# Patient Record
Sex: FEMALE | Race: WHITE | Employment: UNEMPLOYED | URBAN - METROPOLITAN AREA
[De-identification: names, ages, dates, MRNs, and addresses within clinical notes are randomized per-mention and may not be internally consistent; named-entity substitution may affect disease eponyms.]

---

## 2017-06-13 ENCOUNTER — HOSPITAL ENCOUNTER (EMERGENCY)
Facility: CLINIC | Age: 22
Discharge: HOME | End: 2017-06-13
Attending: EMERGENCY MEDICINE | Admitting: EMERGENCY MEDICINE
Payer: COMMERCIAL

## 2017-06-13 VITALS
DIASTOLIC BLOOD PRESSURE: 76 MMHG | WEIGHT: 130 LBS | OXYGEN SATURATION: 99 % | TEMPERATURE: 98.1 F | SYSTOLIC BLOOD PRESSURE: 127 MMHG | RESPIRATION RATE: 16 BRPM

## 2017-06-13 DIAGNOSIS — L03.211 CELLULITIS OF FACE: ICD-10-CM

## 2017-06-13 PROCEDURE — 25000132 ZZH RX MED GY IP 250 OP 250 PS 637: Performed by: EMERGENCY MEDICINE

## 2017-06-13 PROCEDURE — 99283 EMERGENCY DEPT VISIT LOW MDM: CPT

## 2017-06-13 RX ORDER — CEPHALEXIN 500 MG/1
500 CAPSULE ORAL 4 TIMES DAILY
Qty: 28 CAPSULE | Refills: 0 | Status: SHIPPED | OUTPATIENT
Start: 2017-06-13 | End: 2017-06-20

## 2017-06-13 RX ORDER — HYDROCODONE BITARTRATE AND ACETAMINOPHEN 5; 325 MG/1; MG/1
1-2 TABLET ORAL EVERY 4 HOURS PRN
Qty: 15 TABLET | Refills: 0 | Status: SHIPPED | OUTPATIENT
Start: 2017-06-13 | End: 2017-06-16

## 2017-06-13 RX ORDER — HYDROCODONE BITARTRATE AND ACETAMINOPHEN 5; 325 MG/1; MG/1
2 TABLET ORAL ONCE
Status: COMPLETED | OUTPATIENT
Start: 2017-06-13 | End: 2017-06-13

## 2017-06-13 RX ORDER — IBUPROFEN 600 MG/1
600 TABLET, FILM COATED ORAL ONCE
Status: COMPLETED | OUTPATIENT
Start: 2017-06-13 | End: 2017-06-13

## 2017-06-13 RX ORDER — CEPHALEXIN 500 MG/1
1000 CAPSULE ORAL ONCE
Status: COMPLETED | OUTPATIENT
Start: 2017-06-13 | End: 2017-06-13

## 2017-06-13 RX ADMIN — HYDROCODONE BITARTRATE AND ACETAMINOPHEN 2 TABLET: 5; 325 TABLET ORAL at 11:48

## 2017-06-13 RX ADMIN — CEPHALEXIN 1000 MG: 500 CAPSULE ORAL at 11:47

## 2017-06-13 RX ADMIN — IBUPROFEN 600 MG: 600 TABLET ORAL at 11:48

## 2017-06-13 ASSESSMENT — ENCOUNTER SYMPTOMS: FACIAL SWELLING: 1

## 2017-06-13 NOTE — ED PROVIDER NOTES
History     Chief Complaint:  Oral Swelling    HPI   Yamel Hernandez is a 21 year old female who presents to the ED for evaluation of facial swelling. The patient reports she noticed a bump on her right cheek two days ago, and initially believed this was a pimple. She popped this and had a white drainage, after which she had increased facial swelling on the right side. She states that this area is extremely tender to touch, and she has been taking Tylenol to manage the pain. The patient denies any dental pain at this time.     Allergies:  NKDA     Medications:    The patient is not currently taking any prescribed medications.    Past Medical History:    History reviewed. No pertinent past medical history.    Past Surgical History:    History reviewed. No pertinent past surgical history.    Family History:    History reviewed. No pertinent family history.    Social History:  The patient was accompanied to the ED by a significant other.      Review of Systems   HENT: Positive for facial swelling (right sided, tender). Negative for dental problem.    All other systems reviewed and are negative.    Physical Exam   First Vitals:  BP: 127/76  Heart Rate: 101  Temp: 98.1  F (36.7  C)  Resp: 16  Weight: 59 kg (130 lb)  SpO2: 99 %      Physical Exam  Constitutional:  female sitting  HENT: Right facial swelling centered over malar eminence.  induration, but no fluctuance. Slight redness. Tender right submandibular gland. No dental pain or peridontal fullness.   Neck: No adenopathy.   Neurological: Awake, alert, appropriate.    Emergency Department Course     Interventions:  Keflex 1000 mg PO  Norco 2 tablets PO  Ibuprofen 600 mg PO    The patient's symptoms were improved with parenteral narcotics.    Emergency Department Course:  Nursing notes and vitals reviewed.  I performed an exam of the patient as documented above.     I performed a bedside ultrasound on the patient.   The patient reported good relief  after the above interventions.    Findings and plan explained to the patient. Patient discharged home with instructions regarding supportive care, medications, and reasons to return. The importance of close follow-up was reviewed. The patient was prescribed Keflex and Norco.     Impression & Plan      Medical Decision Making:  Yamel Hernandez is a 21 year old  female who had a pimple on her right cheek, which she squeezed, and today the cheek is very swollen and tender. She denies fever or shaking chills. She has bad teeth but does not feel this is teeth related, and she has had no tenderness or drainage there. On exam, her cheek is quite swollen on the right. There is induration but I do not appreciate fluctuance. I did do a bedside ultrasound looking for abscess, which could be drained, and did not find any. The patient will be started on Keflex, Advil, and be given Norco for breakthrough pain. She is warned not to drive or operate machinery with this. I have recommended warm compresses 4x daily and follow up with PMD in two days;   if symptoms worsen, If she has fevers or shaking chills, recheck in the ED.     Diagnosis:    ICD-10-CM    1. Cellulitis of face L03.211      Disposition:  Discharged to home with the following medications:     Discharge Medications:  Discharge Medication List as of 6/13/2017 11:50 AM      START taking these medications    Details   cephALEXin (KEFLEX) 500 MG capsule Take 1 capsule (500 mg) by mouth 4 times daily for 7 days, Disp-28 capsule, R-0, Local Print      HYDROcodone-acetaminophen (NORCO) 5-325 MG per tablet Take 1-2 tablets by mouth every 4 hours as needed, Disp-15 tablet, R-0, Local Print           Anila BULLARD, mckayla serving as a scribe at 11:12 AM on 6/13/2017 to document services personally performed by Kyle Maynard MD based on my observations and the provider's statements to me.    EMERGENCY DEPARTMENT       Kyle Maynard MD  06/13/17  [id number]

## 2017-06-13 NOTE — ED AVS SNAPSHOT
Emergency Department    [address] [place] [address]    Phone:  [phone]    Fax:  [phone]                                       Yamel Hernandez   MRN: [id number]    Department:   Emergency Department   Date of Visit:  6/13/2017           Patient Information     Date Of Birth          1995        Your diagnoses for this visit were:     Cellulitis of face        You were seen by Kyle Maynard MD.      Follow-up Information     Follow up with own md In 2 days.    Why:  warm compresses 4x daily;  recheck ed, If symptoms worsen        Discharge Instructions         Facial Cellulitis  Cellulitis is an infection of the deep layers of skin. A break in the skin, such as a cut or scratch, can let bacteria under the skin. It may also occur from an infected oil gland (pimple) or hair follicle. If the bacteria get to deep layers of the skin, it can be serious. If not treated, cellulitis can get into the bloodstream and lymph nodes. The infection can then spread throughout the body. This causes serious illness.  Cellulitis causes the affected skin to become red, swollen, warm, and sore. The reddened areas have a visible border. An open sore may leak fluid (pus). You may have a fever, chills, and pain.  Cellulitis is treated with antibiotics taken for 7 to 10 days. An open sore may be cleaned and covered with cool wet gauze. Symptoms should get better 1 to 2 days after treatment is started. Make sure to take all the antibiotics for the full number of days until they are gone. Keep taking the medication even if your symptoms go away.  Home care  Follow these tips:    Take all of the antibiotic medicine exactly as directed until it is gone. Don t miss any doses, especially during the first 7 days. Don t stop taking it when your symptoms get better.    Use a cool compress (face cloth soaked in cool water) on your face to help reduce swelling and pain.    You may use acetaminophen or  ibuprofen to reduce pain. Don t use these if you have chronic liver or kidney disease, or ever had a stomach ulcer or GI bleeding. Talk with your healthcare provider first.  Follow-up care  Follow up with your healthcare provider. If your infection does not go away on 1 antibiotic, your healthcare provider will prescribe a different one.  When to seek medical advice  Call your healthcare provider right away if any of these occur:    Fever higher of 100.4  F (38.0  C) or higher after 2 days on antibiotics    Red areas that spread    Swelling or pain that gets worse    Fluid leaking from the skin (pus)    An eyelid that swells shut or leaks fluid (pus)    Headache or neck pain that gets worse    Unusual drowsiness or confusion    Convulsions (seizure)    Change in eyesight             1391-3012 [employer]. [address] [place] [address], [place] [address]. All rights reserved. This information is not intended as a substitute for professional medical care. Always follow your healthcare professional's instructions.          24 Hour Appointment Hotline       To make an appointment at any [facility], call [phone] ([phone]). If you don't have a family doctor or clinic, we will help you find one. [facility] are conveniently located to serve the needs of you and your family.             Review of your medicines      START taking        Dose / Directions Last dose taken    cephALEXin 500 MG capsule   Commonly known as:  KEFLEX   Dose:  500 mg   Quantity:  28 capsule        Take 1 capsule (500 mg) by mouth 4 times daily for 7 days   Refills:  0        HYDROcodone-acetaminophen 5-325 MG per tablet   Commonly known as:  NORCO   Dose:  1-2 tablet   Quantity:  15 tablet        Take 1-2 tablets by mouth every 4 hours as needed   Refills:  0                Prescriptions were sent or printed at these locations (2 Prescriptions)                   Other Prescriptions                Printed at  Department/Unit printer (2 of 2)         cephALEXin (KEFLEX) 500 MG capsule               HYDROcodone-acetaminophen (NORCO) 5-325 MG per tablet                Orders Needing Specimen Collection     None      Pending Results     No orders found from 6/11/2017 to 6/14/2017.            Pending Culture Results     No orders found from 6/11/2017 to 6/14/2017.            Pending Results Instructions     If you had any lab results that were not finalized at the time of your Discharge, you can call the ED Lab Result RN at [phone]. You will be contacted by this team for any positive Lab results or changes in treatment. The nurses are available 7 days a week from 10A to 6:30P.  You can leave a message 24 hours per day and they will return your call.        Test Results From Your Hospital Stay               Clinical Quality Measure: Blood Pressure Screening     Your blood pressure was checked while you were in the emergency department today. The last reading we obtained was  BP: 127/76 . Please read the guidelines below about what these numbers mean and what you should do about them.  If your systolic blood pressure (the top number) is less than 120 and your diastolic blood pressure (the bottom number) is less than 80, then your blood pressure is normal. There is nothing more that you need to do about it.  If your systolic blood pressure (the top number) is 120-139 or your diastolic blood pressure (the bottom number) is 80-89, your blood pressure may be higher than it should be. You should have your blood pressure rechecked within a year by a primary care provider.  If your systolic blood pressure (the top number) is 140 or greater or your diastolic blood pressure (the bottom number) is 90 or greater, you may have high blood pressure. High blood pressure is treatable, but if left untreated over time it can put you at risk for heart attack, stroke, or kidney failure. You should have your blood pressure rechecked by a  "primary care provider within the next 4 weeks.  If your provider in the emergency department today gave you specific instructions to follow-up with your doctor or provider even sooner than that, you should follow that instruction and not wait for up to 4 weeks for your follow-up visit.        Thank you for choosing [facility]       Thank you for choosing [facility] for your care. Our goal is always to provide you with excellent care. Hearing back from our patients is one way we can continue to improve our services. Please take a few minutes to complete the written survey that you may receive in the mail after you visit with us. Thank you!        [facility] medical Information     [employer] lets you send messages to your doctor, view your test results, renew your prescriptions, schedule appointments and more. To sign up, go to [email or web address] . Click on \"Log in\" on the left side of the screen, which will take you to the Welcome page. Then click on \"Sign up Now\" on the right side of the page.     You will be asked to enter the access code listed below, as well as some personal information. Please follow the directions to create your username and password.     Your access code is: [email or web address]  Expires: 2017 11:50 AM     Your access code will  in 90 days. If you need help or a new code, please call your [facility] clinic or [phone].        Care EveryWhere ID     This is your Care EveryWhere ID. This could be used by other organizations to access your [facility] medical records  [id number]        After Visit Summary       This is your record. Keep this with you and show to your community pharmacist(s) and doctor(s) at your next visit.                  "

## 2017-06-13 NOTE — ED AVS SNAPSHOT
Emergency Department    [address] [facility] [address]    Phone:  [phone]    Fax:  [phone]                                       Yamel Hernandez   MRN: [id number]    Department:   Emergency Department   Date of Visit:  6/13/2017           After Visit Summary Signature Page     I have received my discharge instructions, and my questions have been answered. I have discussed any challenges I see with this plan with the nurse or doctor.    ..........................................................................................................................................  Patient/Patient Representative Signature      ..........................................................................................................................................  Patient Representative Print Name and Relationship to Patient    ..................................................               ................................................  Date                                            Time    ..........................................................................................................................................  Reviewed by Signature/Title    ...................................................              ..............................................  Date                                                            Time

## 2017-06-13 NOTE — DISCHARGE INSTRUCTIONS
Facial Cellulitis  Cellulitis is an infection of the deep layers of skin. A break in the skin, such as a cut or scratch, can let bacteria under the skin. It may also occur from an infected oil gland (pimple) or hair follicle. If the bacteria get to deep layers of the skin, it can be serious. If not treated, cellulitis can get into the bloodstream and lymph nodes. The infection can then spread throughout the body. This causes serious illness.  Cellulitis causes the affected skin to become red, swollen, warm, and sore. The reddened areas have a visible border. An open sore may leak fluid (pus). You may have a fever, chills, and pain.  Cellulitis is treated with antibiotics taken for 7 to 10 days. An open sore may be cleaned and covered with cool wet gauze. Symptoms should get better 1 to 2 days after treatment is started. Make sure to take all the antibiotics for the full number of days until they are gone. Keep taking the medication even if your symptoms go away.  Home care  Follow these tips:    Take all of the antibiotic medicine exactly as directed until it is gone. Don t miss any doses, especially during the first 7 days. Don t stop taking it when your symptoms get better.    Use a cool compress (face cloth soaked in cool water) on your face to help reduce swelling and pain.    You may use acetaminophen or ibuprofen to reduce pain. Don t use these if you have chronic liver or kidney disease, or ever had a stomach ulcer or GI bleeding. Talk with your healthcare provider first.  Follow-up care  Follow up with your healthcare provider. If your infection does not go away on 1 antibiotic, your healthcare provider will prescribe a different one.  When to seek medical advice  Call your healthcare provider right away if any of these occur:    Fever higher of 100.4  F (38.0  C) or higher after 2 days on antibiotics    Red areas that spread    Swelling or pain that gets worse    Fluid leaking from the skin (pus)    An  eyelid that swells shut or leaks fluid (pus)    Headache or neck pain that gets worse    Unusual drowsiness or confusion    Convulsions (seizure)    Change in eyesight             9897-3279 [employer]. [address] [place] [address], [place] [address]. All rights reserved. This information is not intended as a substitute for professional medical care. Always follow your healthcare professional's instructions.

## 2017-06-16 ENCOUNTER — HOSPITAL ENCOUNTER (EMERGENCY)
Facility: CLINIC | Age: 22
Discharge: HOME | End: 2017-06-16
Attending: EMERGENCY MEDICINE | Admitting: EMERGENCY MEDICINE
Payer: COMMERCIAL

## 2017-06-16 VITALS
DIASTOLIC BLOOD PRESSURE: 75 MMHG | TEMPERATURE: 98.4 F | WEIGHT: 130 LBS | HEIGHT: 67 IN | BODY MASS INDEX: 20.4 KG/M2 | SYSTOLIC BLOOD PRESSURE: 119 MMHG | OXYGEN SATURATION: 100 %

## 2017-06-16 DIAGNOSIS — L02.01 FACIAL ABSCESS: ICD-10-CM

## 2017-06-16 PROCEDURE — 10060 I&D ABSCESS SIMPLE/SINGLE: CPT

## 2017-06-16 PROCEDURE — 99283 EMERGENCY DEPT VISIT LOW MDM: CPT | Mod: 25

## 2017-06-16 RX ORDER — DOXYCYCLINE 100 MG/1
100 CAPSULE ORAL 2 TIMES DAILY
Qty: 20 CAPSULE | Refills: 0 | Status: SHIPPED | OUTPATIENT
Start: 2017-06-16 | End: 2017-06-26

## 2017-06-16 RX ORDER — HYDROCODONE BITARTRATE AND ACETAMINOPHEN 5; 325 MG/1; MG/1
1-2 TABLET ORAL EVERY 6 HOURS PRN
Qty: 15 TABLET | Refills: 0 | Status: SHIPPED | OUTPATIENT
Start: 2017-06-16 | End: 2018-06-22

## 2017-06-16 ASSESSMENT — ENCOUNTER SYMPTOMS
FACIAL SWELLING: 1
WOUND: 1

## 2017-06-16 NOTE — ED AVS SNAPSHOT
Emergency Department    [address] [facility] [address]    Phone:  [phone]    Fax:  [phone]                                       Yamel Hernandez   MRN: [id number]    Department:   Emergency Department   Date of Visit:  6/16/2017           Patient Information     Date Of Birth          1995        Your diagnoses for this visit were:     Facial abscess        You were seen by Jaquan Frances MD.      Follow-up Information     Follow up with  Emergency Department.    Specialty:  EMERGENCY MEDICINE    Why:  As needed, If symptoms worsen    Contact information:    [address] [facility] [id number] [phone]        Discharge Instructions         Abscess (Incision & Drainage)  An abscess is sometimes called a boil. It happens when bacteria get trapped under the skin and start to grow. Pus forms inside the abscess as the body responds to the bacteria. An abscess can happen with an insect bite, ingrown hair, blocked oil gland, pimple, cyst, or puncture wound.  Your healthcare provider has drained the pus from your abscess. If the abscess pocket was large, your healthcare provider may have put in gauze packing. Your provider will need to remove it on your next visit. He or she may also replace it at that time. You may not need antibiotics to treat a simple abscess, unless the infection is spreading into the skin around the wound (cellulitis).  The wound will take about 1 to 2 weeks to heal, depending on the size of the abscess. Healthy tissue will grow from the bottom and sides of the opening until it seals over.  Home care  These tips can help your wound heal:    The wound may drain for the first 2 days. Cover the wound with a clean dry dressing. Change the dressing if it becomes soaked with blood or pus.    If a gauze packing was placed inside the abscess pocket, you may be told to remove it yourself. You may do this in the shower. Once the packing is removed,  you should wash the area in the shower, or clean the area as directed by your provider. Continue to do this until the skin opening has closed. Make sure you wash your hands after changing the packing or cleaning the wound.    If you were prescribed antibiotics, take them as directed until they are all gone.    You may use acetaminophen or ibuprofen to control pain, unless another pain medicine was prescribed. If you have liver disease or ever had a stomach ulcer, talk with your doctor before using these medicines.  Follow-up care  Follow up with your healthcare provider, or as advised. If a gauze packing was put in your wound, it should be removed in 1 to 2 days. Check your wound every day for any signs that the infection is getting worse. The signs are listed below.  When to seek medical advice  Call your healthcare provider right away if any of these occur:    Increasing redness or swelling    Red streaks in the skin leading away from the wound    Increasing local pain or swelling    Continued pus draining from the wound 2 days after treatment    Fever of 100.4 F (38 C) or higher, or as directed by your healthcare provider    Boil returns when you are at home  Date Last Reviewed: 9/1/2016 2000-2017 [employer]. [address] [place] [address]. All rights reserved. This information is not intended as a substitute for professional medical care. Always follow your healthcare professional's instructions.        Doxycycline  Norco as directed  Continue Chelaile      24 Hour Appointment Hotline       To make an appointment at any [facility] clinic, call [phone] ([phone]). If you don't have a family doctor or clinic, we will help you find one. [facility] clinics are conveniently located to serve the needs of you and your family.             Review of your medicines      START taking        Dose / Directions Last dose taken    doxycycline 100 MG capsule   Commonly known as:  VIBRAMYCIN    Dose:  100 mg   Quantity:  20 capsule        Take 1 capsule (100 mg) by mouth 2 times daily for 10 days   Refills:  0          CONTINUE these medicines which may have CHANGED, or have new prescriptions. If we are uncertain of the size of tablets/capsules you have at home, strength may be listed as something that might have changed.        Dose / Directions Last dose taken    HYDROcodone-acetaminophen 5-325 MG per tablet   Commonly known as:  NORCO   Dose:  1-2 tablet   What changed:    - when to take this  - reasons to take this   Quantity:  15 tablet        Take 1-2 tablets by mouth every 6 hours as needed for moderate to severe pain   Refills:  0          Our records show that you are taking the medicines listed below. If these are incorrect, please call your family doctor or clinic.        Dose / Directions Last dose taken    cephALEXin 500 MG capsule   Commonly known as:  KEFLEX   Dose:  500 mg   Quantity:  28 capsule        Take 1 capsule (500 mg) by mouth 4 times daily for 7 days   Refills:  0                Prescriptions were sent or printed at these locations (2 Prescriptions)                   Other Prescriptions                Printed at Department/Unit printer (2 of 2)         doxycycline (VIBRAMYCIN) 100 MG capsule               HYDROcodone-acetaminophen (NORCO) 5-325 MG per tablet                Orders Needing Specimen Collection     None      Pending Results     No orders found from 6/14/2017 to 6/17/2017.            Pending Culture Results     No orders found from 6/14/2017 to 6/17/2017.            Pending Results Instructions     If you had any lab results that were not finalized at the time of your Discharge, you can call the ED Lab Result RN at [phone]. You will be contacted by this team for any positive Lab results or changes in treatment. The nurses are available 7 days a week from 10A to 6:30P.  You can leave a message 24 hours per day and they will return your call.        Test Results  From Your Hospital Stay               Clinical Quality Measure: Blood Pressure Screening     Your blood pressure was checked while you were in the emergency department today. The last reading we obtained was  BP: 130/59 . Please read the guidelines below about what these numbers mean and what you should do about them.  If your systolic blood pressure (the top number) is less than 120 and your diastolic blood pressure (the bottom number) is less than 80, then your blood pressure is normal. There is nothing more that you need to do about it.  If your systolic blood pressure (the top number) is 120-139 or your diastolic blood pressure (the bottom number) is 80-89, your blood pressure may be higher than it should be. You should have your blood pressure rechecked within a year by a primary care provider.  If your systolic blood pressure (the top number) is 140 or greater or your diastolic blood pressure (the bottom number) is 90 or greater, you may have high blood pressure. High blood pressure is treatable, but if left untreated over time it can put you at risk for heart attack, stroke, or kidney failure. You should have your blood pressure rechecked by a primary care provider within the next 4 weeks.  If your provider in the emergency department today gave you specific instructions to follow-up with your doctor or provider even sooner than that, you should follow that instruction and not wait for up to 4 weeks for your follow-up visit.        Thank you for choosing [facility]       Thank you for choosing [facility] for your care. Our goal is always to provide you with excellent care. Hearing back from our patients is one way we can continue to improve our services. Please take a few minutes to complete the written survey that you may receive in the mail after you visit with us. Thank you!        [employer] Information     [employer] lets you send messages to your doctor, view your test results, renew your prescriptions, schedule  "appointments and more. To sign up, go to [email or web address] . Click on \"Log in\" on the left side of the screen, which will take you to the Welcome page. Then click on \"Sign up Now\" on the right side of the page.     You will be asked to enter the access code listed below, as well as some personal information. Please follow the directions to create your username and password.     Your access code is: [email or web address]  Expires: 2017 11:50 AM     Your access code will  in 90 days. If you need help or a new code, please call your [facility] clinic or [phone].        Care EveryWhere ID     This is your Care EveryWhere ID. This could be used by other organizations to access your [facility] medical records  [id number]        After Visit Summary       This is your record. Keep this with you and show to your community pharmacist(s) and doctor(s) at your next visit.                  "

## 2017-06-16 NOTE — DISCHARGE INSTRUCTIONS
Abscess (Incision & Drainage)  An abscess is sometimes called a boil. It happens when bacteria get trapped under the skin and start to grow. Pus forms inside the abscess as the body responds to the bacteria. An abscess can happen with an insect bite, ingrown hair, blocked oil gland, pimple, cyst, or puncture wound.  Your healthcare provider has drained the pus from your abscess. If the abscess pocket was large, your healthcare provider may have put in gauze packing. Your provider will need to remove it on your next visit. He or she may also replace it at that time. You may not need antibiotics to treat a simple abscess, unless the infection is spreading into the skin around the wound (cellulitis).  The wound will take about 1 to 2 weeks to heal, depending on the size of the abscess. Healthy tissue will grow from the bottom and sides of the opening until it seals over.  Home care  These tips can help your wound heal:    The wound may drain for the first 2 days. Cover the wound with a clean dry dressing. Change the dressing if it becomes soaked with blood or pus.    If a gauze packing was placed inside the abscess pocket, you may be told to remove it yourself. You may do this in the shower. Once the packing is removed, you should wash the area in the shower, or clean the area as directed by your provider. Continue to do this until the skin opening has closed. Make sure you wash your hands after changing the packing or cleaning the wound.    If you were prescribed antibiotics, take them as directed until they are all gone.    You may use acetaminophen or ibuprofen to control pain, unless another pain medicine was prescribed. If you have liver disease or ever had a stomach ulcer, talk with your doctor before using these medicines.  Follow-up care  Follow up with your healthcare provider, or as advised. If a gauze packing was put in your wound, it should be removed in 1 to 2 days. Check your wound every day for any  signs that the infection is getting worse. The signs are listed below.  When to seek medical advice  Call your healthcare provider right away if any of these occur:    Increasing redness or swelling    Red streaks in the skin leading away from the wound    Increasing local pain or swelling    Continued pus draining from the wound 2 days after treatment    Fever of 100.4 F (38 C) or higher, or as directed by your healthcare provider    Boil returns when you are at home  Date Last Reviewed: 9/1/2016 2000-2017 [employer]. [address] [place] [address]. All rights reserved. This information is not intended as a substitute for professional medical care. Always follow your healthcare professional's instructions.        Doxycycline  Norco as directed  Continue Keflex

## 2017-06-16 NOTE — ED AVS SNAPSHOT
Emergency Department    [address] [facility] [address]    Phone:  [phone]    Fax:  [phone]                                       Yamel Hernandez   MRN: [id number]    Department:   Emergency Department   Date of Visit:  6/16/2017           After Visit Summary Signature Page     I have received my discharge instructions, and my questions have been answered. I have discussed any challenges I see with this plan with the nurse or doctor.    ..........................................................................................................................................  Patient/Patient Representative Signature      ..........................................................................................................................................  Patient Representative Print Name and Relationship to Patient    ..................................................               ................................................  Date                                            Time    ..........................................................................................................................................  Reviewed by Signature/Title    ...................................................              ..............................................  Date                                                            Time

## 2017-06-16 NOTE — ED PROVIDER NOTES
"  History     Chief Complaint:  Cellulitis     HPI   Yamel Hernandez is a 21 year old female who presents to the emergency department today for evaluation of cellulitis. The patient had a diagnosis of cellulitis on her right cheek and has been taking keflex as prescribed, but was not given any topical creams. She reports scratching the area this afternoon and it started to drain pus and blood, then the area became more swollen. The patient also notes having jaw pain and thinks her wisdom teeth might be hurting her. No other symptoms at this time.    Allergies:  No Known Drug Allergies     Medications:    Keflex  Norco     Past Medical History:    History reviewed. No pertinent past medical history.    Past Surgical History:    History reviewed. No pertinent past surgical history.    Family History:    History reviewed. No pertinent family history.     Social History:  The patient was accompanied to the ED by boyfriend.  Smoking Status: former   Marital Status:  Single [1]     Review of Systems   HENT: Positive for facial swelling (right cheek).    Skin: Positive for wound (abscess, right cheek).   All other systems reviewed and are negative.    Physical Exam   Vitals:  Patient Vitals for the past 24 hrs:   BP Temp Temp src Heart Rate SpO2 Height Weight   06/16/17 0054 130/59 98.4  F (36.9  C) Oral 93 99 % 1.702 m (5' 7\") 59 kg (130 lb)     Physical Exam    General: Resting comfortably on the gurney  Head:  The scalp, face, and head appear normal  Eyes:  The pupils are normal    Conjunctivae and sclera appear normal  ENT:    The nose is normal    Ears/pinnae are normal    Right mandibular second premolar and 6 year molar are necrotic and down to the gumline    No intraoral abscess     Right upper second premolar is also similarly necrotic to the gingiva    There is a skin based abscess  Involving the right cheek by palpation this is 2 x 2 cm there is some mild serous   drainage for the skin, there is no marley " cellulitis  Neck:  Normal range of motion  Skin:  No rash or lesions noted.  Neuro: Speech is normal and fluent  Psych:  Awake. Alert.  Normal affect.      Appropriate interactions    Emergency Department Course     Procedures:  Regional Nerve Block      INDICATIONS: Right facial abscess    ANESTHESIA: Right infraoral nerve block using Bupivacaine without Epinephrine. Intra-oral injection      PATIENT STATUS: The patient tolerated the procedure well and there were no immediate complications.          Procedure: Incision and Drainage     LOCATIONS:  Right Cheek      ANESTHESIA:  Infraorbital nerve block as noted above.     PREPARATION:  Cleansed with iodine.    PROCEDURE:  Area was incised with # 11 Blade (Sharp Point) with a Single Straight  5 mm incision.  Wound treatment included Deloculation, Purulent Drainage and Expression of Material. Appropriate dressing was applied to cover the area.    Patient Status: Patient tolerated the procedure well. There were no complications.    Emergency Department Course:  Nursing notes and vitals reviewed.  I performed an exam of the patient as documented above.   At 0143 dental block performed, see above.  At 0220 incision and drainage procedure performed on right cheek.  I discussed the treatment plan with the patient. They expressed understanding of this plan and consented to discharge. They will be discharged home with instructions for care and follow up. In addition, the patient will return to the emergency department if their symptoms persist, worsen, if new symptoms arise or if there is any concern.  All questions were answered.  I personally reviewed the results with the patient and answered all related questions prior to discharge.    Impression & Plan      Medical Decision Making:  Yamel Hernandez is a 21 year old female who presents wit ha right facial abscess as noted above. This has been unresponsive to keflex mainly because it has been unable to drain. The abscess  was initially aspirated and purulence was noted, so a small 0.5 cm incision was made with an #11 blade and the pus was evacuated. Deloculations were broken up with a montserrat clamp. There is no facial cellulitis. Doxycycline will be added as well, in addition a pain medication will be given for the next day or two given the size of the abscess and the location. The patient should continue to take the keflex. Return for worsening symptoms as discussed.    Diagnosis:  Facial abscess L02.01      Disposition:   The patient was discharged to home.    Discharge Medications:   DOXYCYCLINE (VIBRAMYCIN) 100 MG CAPSULE    Take 1 capsule (100 mg) by mouth 2 times daily for 10 days    HYDROCODONE-ACETAMINOPHEN (NORCO) 5-325 MG PER TABLET    Take 1-2 tablets by mouth every 6 hours as needed for moderate to severe pain     Scribe Disclosure:  Mayank BULLARD, am serving as a scribe at 1:06 AM on 6/16/2017 to document services personally performed by Jaquan Frances MD, based on my observations and the provider's statements to me.  6/16/2017    EMERGENCY DEPARTMENT       Jaquan Frances MD  06/16/17 0703

## 2018-06-22 ENCOUNTER — HOSPITAL ENCOUNTER (INPATIENT)
Facility: CLINIC | Age: 23
LOS: 10 days | Discharge: HOME | End: 2018-07-02
Attending: PSYCHIATRY & NEUROLOGY | Admitting: PSYCHIATRY & NEUROLOGY
Payer: MEDICAID

## 2018-06-22 DIAGNOSIS — F11.23 OPIOID DEPENDENCE WITH WITHDRAWAL (H): ICD-10-CM

## 2018-06-22 PROBLEM — F11.93 HEROIN WITHDRAWAL (H): Status: ACTIVE | Noted: 2018-06-22

## 2018-06-22 LAB
AMPHETAMINES UR QL SCN: POSITIVE
BARBITURATES UR QL: NEGATIVE
BENZODIAZ UR QL: NEGATIVE
CANNABINOIDS UR QL SCN: POSITIVE
COCAINE UR QL: NEGATIVE
ETHANOL UR QL SCN: NEGATIVE
OPIATES UR QL SCN: POSITIVE

## 2018-06-22 PROCEDURE — 80307 DRUG TEST PRSMV CHEM ANLYZR: CPT | Performed by: PSYCHIATRY & NEUROLOGY

## 2018-06-22 PROCEDURE — 25000132 ZZH RX MED GY IP 250 OP 250 PS 637: Performed by: PSYCHIATRY & NEUROLOGY

## 2018-06-22 PROCEDURE — HZ2ZZZZ DETOXIFICATION SERVICES FOR SUBSTANCE ABUSE TREATMENT: ICD-10-PCS | Performed by: PSYCHIATRY & NEUROLOGY

## 2018-06-22 PROCEDURE — 12800012 ZZH R&B CD MH INTERMEDIATE ADULT

## 2018-06-22 PROCEDURE — 99285 EMERGENCY DEPT VISIT HI MDM: CPT | Mod: Z6 | Performed by: PSYCHIATRY & NEUROLOGY

## 2018-06-22 PROCEDURE — 25000125 ZZHC RX 250: Performed by: PSYCHIATRY & NEUROLOGY

## 2018-06-22 PROCEDURE — 99285 EMERGENCY DEPT VISIT HI MDM: CPT | Performed by: PSYCHIATRY & NEUROLOGY

## 2018-06-22 PROCEDURE — 80320 DRUG SCREEN QUANTALCOHOLS: CPT | Performed by: PSYCHIATRY & NEUROLOGY

## 2018-06-22 RX ORDER — ACETAMINOPHEN 325 MG/1
650 TABLET ORAL EVERY 4 HOURS PRN
Status: DISCONTINUED | OUTPATIENT
Start: 2018-06-22 | End: 2018-07-02 | Stop reason: HOSPADM

## 2018-06-22 RX ORDER — BUPRENORPHINE 2 MG/1
4 TABLET SUBLINGUAL ONCE
Status: DISCONTINUED | OUTPATIENT
Start: 2018-06-23 | End: 2018-06-23

## 2018-06-22 RX ORDER — BISACODYL 10 MG
10 SUPPOSITORY, RECTAL RECTAL DAILY PRN
Status: DISCONTINUED | OUTPATIENT
Start: 2018-06-22 | End: 2018-07-02 | Stop reason: HOSPADM

## 2018-06-22 RX ORDER — TRAZODONE HYDROCHLORIDE 50 MG/1
50 TABLET, FILM COATED ORAL
Status: DISCONTINUED | OUTPATIENT
Start: 2018-06-22 | End: 2018-07-02 | Stop reason: HOSPADM

## 2018-06-22 RX ORDER — LOPERAMIDE HCL 2 MG
2 CAPSULE ORAL 4 TIMES DAILY PRN
Status: DISCONTINUED | OUTPATIENT
Start: 2018-06-22 | End: 2018-07-02 | Stop reason: HOSPADM

## 2018-06-22 RX ORDER — BUPRENORPHINE 2 MG/1
4 TABLET SUBLINGUAL 2 TIMES DAILY
Status: DISCONTINUED | OUTPATIENT
Start: 2018-06-23 | End: 2018-07-02 | Stop reason: HOSPADM

## 2018-06-22 RX ORDER — CLONIDINE HYDROCHLORIDE 0.1 MG/1
0.1 TABLET ORAL 2 TIMES DAILY PRN
Status: DISCONTINUED | OUTPATIENT
Start: 2018-06-22 | End: 2018-07-02 | Stop reason: HOSPADM

## 2018-06-22 RX ORDER — LORAZEPAM 1 MG/1
1-2 TABLET ORAL EVERY 4 HOURS PRN
Status: DISPENSED | OUTPATIENT
Start: 2018-06-22 | End: 2018-06-23

## 2018-06-22 RX ORDER — ONDANSETRON 4 MG/1
4 TABLET, ORALLY DISINTEGRATING ORAL EVERY 6 HOURS PRN
Status: DISCONTINUED | OUTPATIENT
Start: 2018-06-22 | End: 2018-07-02 | Stop reason: HOSPADM

## 2018-06-22 RX ORDER — HYDROXYZINE HYDROCHLORIDE 25 MG/1
25 TABLET, FILM COATED ORAL EVERY 4 HOURS PRN
Status: DISCONTINUED | OUTPATIENT
Start: 2018-06-22 | End: 2018-07-02 | Stop reason: HOSPADM

## 2018-06-22 RX ORDER — IBUPROFEN 600 MG/1
600 TABLET, FILM COATED ORAL EVERY 6 HOURS PRN
Status: DISCONTINUED | OUTPATIENT
Start: 2018-06-22 | End: 2018-07-01

## 2018-06-22 RX ORDER — ALUMINA, MAGNESIA, AND SIMETHICONE 2400; 2400; 240 MG/30ML; MG/30ML; MG/30ML
30 SUSPENSION ORAL EVERY 4 HOURS PRN
Status: DISCONTINUED | OUTPATIENT
Start: 2018-06-22 | End: 2018-07-02 | Stop reason: HOSPADM

## 2018-06-22 RX ADMIN — LORAZEPAM 2 MG: 1 TABLET ORAL at 22:07

## 2018-06-22 RX ADMIN — NICOTINE POLACRILEX 2 MG: 2 GUM, CHEWING ORAL at 20:26

## 2018-06-22 RX ADMIN — HYDROXYZINE HYDROCHLORIDE 25 MG: 25 TABLET ORAL at 20:25

## 2018-06-22 RX ADMIN — IBUPROFEN 600 MG: 600 TABLET ORAL at 20:25

## 2018-06-22 RX ADMIN — ONDANSETRON 4 MG: 4 TABLET, ORALLY DISINTEGRATING ORAL at 22:07

## 2018-06-22 RX ADMIN — LOPERAMIDE HYDROCHLORIDE 2 MG: 2 CAPSULE ORAL at 20:26

## 2018-06-22 ASSESSMENT — ENCOUNTER SYMPTOMS
APPETITE CHANGE: 1
ENDOCRINE NEGATIVE: 1
ARTHRALGIAS: 1
HYPERACTIVE: 0
MYALGIAS: 1
RESPIRATORY NEGATIVE: 1
ACTIVITY CHANGE: 1
HALLUCINATIONS: 0
CARDIOVASCULAR NEGATIVE: 1
HEMATOLOGIC/LYMPHATIC NEGATIVE: 1
NERVOUS/ANXIOUS: 0
GASTROINTESTINAL NEGATIVE: 1
NEUROLOGICAL NEGATIVE: 1
SLEEP DISTURBANCE: 1
EYES NEGATIVE: 1
DECREASED CONCENTRATION: 1
DIAPHORESIS: 1
CHILLS: 1

## 2018-06-22 ASSESSMENT — ACTIVITIES OF DAILY LIVING (ADL)
GROOMING: INDEPENDENT
ORAL_HYGIENE: INDEPENDENT
DRESS: INDEPENDENT

## 2018-06-22 NOTE — ED NOTES
Patient arrives to [facility]. Psych Associate explains process and gives patient urine cup. Patient told about meeting with Mental Health  and Psychiatrist. Patient told about 2-5 hour time frame for complete evaluation.

## 2018-06-22 NOTE — IP AVS SNAPSHOT
MRN:[id number]                      After Visit Summary   6/22/2018    Yamel Hernandez    MRN: [id number]           Thank you!     Thank you for choosing [facility] for your care. Our goal is always to provide you with excellent care.        Patient Information     Date Of Birth          1995        Designated Caregiver       Most Recent Value    Caregiver    Will someone help with your care after discharge? yes    Name of designated caregiver see previous    Phone number of caregiver [phone]    Caregiver address na      About your hospital stay     You were admitted on:  June 22, 2018 You last received care in the:  [facility]    You were discharged on:  July 2, 2018       Who to Call     For medical emergencies, please call 911.  For non-urgent questions about your medical care, please call your primary care provider or clinic, None          Attending Provider     Provider Specialty    Salomón Hunter MD Psychiatry    Yeimy Valiente MD Psychiatry       Primary Care Provider Fax #    [facility] [phone]      Further instructions from your care team                     Behavioral Discharge Planning and Instructions  THANK YOU FOR CHOOSING THE [facility]  [phone]    Summary: You were admitted to [facility] on 6/22/18 for detoxification from opiates.  A medical exam was performed that included lab work. You have met with a  and opted to enter treatment at [facility].  You are scheduled for admission on Friday, July 6th at 9:30 AM.  The address is [address] [place] [address].  Please arrive about 15 minutes early.  Bring your clothing, a 30 day supply of medications and your hygiene products with you.  Your funding is through [facility].  You have a Suboxone maintenance appointment scheduled as noted below.  Please take care and make your recovery a priority, Miss Hernandez!    Main Diagnosis: Per   Yeimy Valiente MD;  305.20 (F12.10) Cannabis Use Disorder Mild  304.00 (F11.20) Opioid Use Disorder Severe  304.40 (F15.20) Amphetamine Use Disorder Severe    Major Treatments, Procedures and Findings:  You have withdrawn from opiates  using buprenorphine.  You have met with a  to develop a treatment plan for discharge.  You have had labs drawn and those results have been reviewed with you. Please take a copy of your lab work with you to your next primary care physician appointment.    Symptoms to Report:  If you experience more anxiety, confusion, sleeplessness, deep sadness or thoughts of suicide, notify your treatment team or notify your primary care physician. IF ANY OF THE SYMPTOMS YOU ARE EXPERIENCING ARE A MEDICAL EMERGENCY CALL 911 IMMEDIATELY.     Lifestyle Adjustment: Adjust your lifestyle to get enough sleep, relaxation, exercise and  good nutrition. Continue to develop healthy coping skills to decrease stress and promote a sober living environment. Do not use alcohol, illegal drugs or addictive medications other than what is currently prescribed. AA, NA, and  Sponsor are excellent resources for support.     Suboxone Maintenance:  [facility], [address] [facility]   Thursday, July 5th @ 1:45 PM with Dr. Sampson  #[phone]    Disposition:   [facility] Friday, July 6th @ 9:30 AM  [address] [facility]  [phone]    Resource: National [place] on Mental Illness (www.[place].cherelle.org): 932.565.4937 or 313-962-6286.  Alcoholics Anonymous (www.alcoholics-anonymous.org): Check your phone book for your local chapter.  Suicide Awareness Voices of Education (SAVE) (www.save.org): 418-659-IZIC (5583)  National Suicide Prevention Line (www.mentalhealthmn.org): 978-691-JHXJ (7290)  Mental Health Consumer/Survivor Network of [place] (www.mhcsn.net): 573.270.4934 or 138-964-4010  Mental Health Association of [place] (www.mentalhealth.org): 218.171.8738 or 920-513-3743    General  "Medication Instructions:  See your medication sheet(s) for instructions.   Take all medicines as directed.  Make no changes unless your doctor suggests them.   Do not use any drugs not prescribed by your provider.  Avoid alcohol.    Any follow up concerns:  Nursing questions call the Unit -[facility] [phone]  Medical Record call [phone]   Outpatient Behavioral Intake call [phone]  LP+ Wait List/Bed Availability call [phone]  The entire treatment team has appreciated the opportunity to work with you Yamel.  We wish you the best in the future and with your lifelong recovery goals. Please bring this discharge folder with you to all follow up appointments.  It contains your lab results, diagnosis, medication list and discharge recommendations.  THANK YOU FOR CHOOSING THE [facility]     Pending Results     No orders found from 6/20/2018 to 6/23/2018.            Statement of Approval     Ordered          07/02/18 1027  I have reviewed and agree with all the recommendations and orders detailed in this document.  EFFECTIVE NOW     Approved and electronically signed by:  Yeimy Valiente MD             Admission Information     Date & Time Provider Department Dept. Phone    6/22/2018 Yeimy Valiente MD [facility] [phone]      Your Vitals Were     Blood Pressure Pulse Temperature Respirations Height Weight    108/69 105 97.4  F (36.3  C) (Oral) 16 1.727 m (5' 8\") 65.3 kg (144 lb)    Pulse Oximetry BMI (Body Mass Index)                100% 21.9 kg/m2          MyChart Information     [employer] lets you send messages to your doctor, view your test results, renew your prescriptions, schedule appointments and more. To sign up, go to [email or web address] . Click on \"Log in\" on the left side of the screen, which will take you to the Welcome page. Then click on \"Sign up Now\" on the right side of the page.     You will be asked to enter the " access code listed below, as well as some personal information. Please follow the directions to create your username and password.     Your access code is: [email or web address]  Expires: 2018 10:47 AM     Your access code will  in 90 days. If you need help or a new code, please call your [place] clinic or [phone].        Care EveryWhere ID     This is your Care EveryWhere ID. This could be used by other organizations to access your [place] medical records  [id number]        Equal Access to Services     [facility]: Hadirma onofre Soflower, wanidhi luqadaha, qaybchemo kaalmakaylah escobedo, nova hoffmann . So [facility] [phone].    ATENCIÓN: Si habla español, tiene a rosas disposición servicios gratuitos de asistencia lingüística. Llame al [phone].    We comply with applicable federal civil rights laws and [place] laws. We do not discriminate on the basis of race, color, national origin, age, disability, sex, sexual orientation, or gender identity.               Review of your medicines      START taking        Dose / Directions    buprenorphine HCl-naloxone HCl 8-2 MG per film   Commonly known as:  SUBOXONE   Used for:  Opioid dependence with withdrawal (H)        Dose:  1 Film   Place 1 Film under the tongue daily   Quantity:  7 Film   Refills:  0       ferrous sulfate 325 (65 Fe) MG tablet   Commonly known as:  IRON   Used for:  Opioid dependence with withdrawal (H)        Dose:  325 mg   Take 1 tablet (325 mg) by mouth 2 times daily   Quantity:  100 tablet   Refills:  0       hydrOXYzine 25 MG tablet   Commonly known as:  ATARAX   Used for:  Opioid dependence with withdrawal (H)        Dose:  25 mg   Take 1 tablet (25 mg) by mouth every 8 hours as needed for anxiety   Quantity:  30 tablet   Refills:  0       nicotine polacrilex 2 MG gum   Commonly known as:  NICORETTE   Used for:  Opioid dependence with withdrawal (H)        Dose:  2-4 mg   Place 1-2 each (2-4 mg) inside  cheek every 2 hours as needed for other (nicotine withdrawal symptoms)   Quantity:  30 tablet   Refills:  1       prazosin 1 MG capsule   Commonly known as:  MINIPRESS   Used for:  Opioid dependence with withdrawal (H)        Dose:  1 mg   Take 1 capsule (1 mg) by mouth At Bedtime   Quantity:  30 capsule   Refills:  0       traZODone 50 MG tablet   Commonly known as:  DESYREL   Used for:  Opioid dependence with withdrawal (H)        Dose:  50 mg   Take 1 tablet (50 mg) by mouth nightly as needed for sleep   Quantity:  30 tablet   Refills:  0         CONTINUE these medicines which have NOT CHANGED        Dose / Directions    etonogestrel 68 MG Impl   Commonly known as:  IMPLANON/NEXPLANON        Dose:  1 each   1 each by Subdermal route once   Refills:  0            Where to get your medicines      These medications were sent to [facility] [place], [place] - [address]  [address] [address]     Phone:  [phone]     ferrous sulfate 325 (65 Fe) MG tablet    hydrOXYzine 25 MG tablet    nicotine polacrilex 2 MG gum    prazosin 1 MG capsule    traZODone 50 MG tablet         Some of these will need a paper prescription and others can be bought over the counter. Ask your nurse if you have questions.     Bring a paper prescription for each of these medications     buprenorphine HCl-naloxone HCl 8-2 MG per film                Protect others around you: Learn how to safely use, store and throw away your medicines at www.disposemymeds.org.             Medication List: This is a list of all your medications and when to take them. Check marks below indicate your daily home schedule. Keep this list as a reference.      Medications           Morning Afternoon Evening Bedtime As Needed    buprenorphine HCl-naloxone HCl 8-2 MG per film   Commonly known as:  SUBOXONE   Place 1 Film under the tongue daily                                   etonogestrel 68 MG Impl   Commonly known as:   IMPLANON/NEXPLANON   1 each by Subdermal route once                                   ferrous sulfate 325 (65 Fe) MG tablet   Commonly known as:  IRON   Take 1 tablet (325 mg) by mouth 2 times daily   Last time this was given:  325 mg on 7/2/2018  8:33 AM                                      hydrOXYzine 25 MG tablet   Commonly known as:  ATARAX   Take 1 tablet (25 mg) by mouth every 8 hours as needed for anxiety   Last time this was given:  25 mg on 7/2/2018  8:33 AM                            For anxiety       nicotine polacrilex 2 MG gum   Commonly known as:  NICORETTE   Place 1-2 each (2-4 mg) inside cheek every 2 hours as needed for other (nicotine withdrawal symptoms)   Last time this was given:  4 mg on 7/2/2018  8:33 AM                            For nicotine replacement       prazosin 1 MG capsule   Commonly known as:  MINIPRESS   Take 1 capsule (1 mg) by mouth At Bedtime   Last time this was given:  1 mg on 7/1/2018  9:29 PM                                   traZODone 50 MG tablet   Commonly known as:  DESYREL   Take 1 tablet (50 mg) by mouth nightly as needed for sleep   Last time this was given:  50 mg on 7/1/2018 11:46 PM                            For sleep

## 2018-06-22 NOTE — PHARMACY-ADMISSION MEDICATION HISTORY
Admission Medication History status for the 6/22/2018 admission is complete.  See EPIC admission navigator for Prior to Admission medications.    Medication history sources:  Patient     Medication history source reliability: Poor    Medication adherence:  Good    Changes made to PTA medication list (reason)  Added: none  Deleted: none  Changed: none    Additional medication history information (including reliability of information, actions taken by pharmacist):   -Patient was a poor historian; was difficult to wake and keep alert.   -Patient stated that she is currently not taking any oral medications.   -Patient stated that she still has Implanon/Nexplanon implant still in. Did not recall when it was placed.   -Patient did not report any particular pharmacy that she has filled at recently.     Time spent in this activity: 15 minutes    Medication history completed by: Komal Chopra, PD3 Pharmacy Intern     Prior to Admission medications    Medication Sig Last Dose Taking? Auth Provider   etonogestrel (IMPLANON/NEXPLANON) 68 MG IMPL 1 each by Subdermal route once  Yes Reported, Patient

## 2018-06-22 NOTE — ED NOTES
ED to Behavioral Floor Handoff    SITUATION  Yamel Hernandez is a 22 year old female who speaks English and lives is homeless unknown The patient arrived in the ED by private car from home with a complaint of Addiction Problem (Uses IV heroin. Uses approximately one gram IV. Last use 0400. Not sleeping for 3 days. Homeless. Stayed outside. Looking for detox. Very sleepy in triage. )  .The patient's current symptoms started/worsened 3 month(s) ago and during this time the symptoms have increased.   In the ED, pt was diagnosed with   Final diagnoses:   Opioid dependence with withdrawal (H)        Initial vitals were: BP: 116/62  Heart Rate: 100  Temp: 96.5  F (35.8  C)  Resp: 16  Weight: 70.9 kg (156 lb 6 oz)  SpO2: 100 %   --------  Is the patient diabetic? No   If yes, last blood glucose? --     If yes, was this treated in the ED? --  --------  Is the patient inebriated (ETOH) No or Impaired on other substances? Yes  MSSA done? N/A  Last MSSA score: --    Were withdrawal symptoms treated? N/A  Does the patient have a seizure history? No. If yes, date of most recent seizure--  --------  Is the patient patient experiencing suicidal ideation? denies current or recent suicidal ideation     Homicidal ideation? denies current or recent homicidal ideation or behaviors.    Self-injurious behavior/urges? denies current or recent self injurious behavior or ideation.  ------  Was pt aggressive in the ED No  Was a code called No  Is the pt now cooperative? Yes  -------  Meds given in ED: Medications - No data to display   Family present during ED course? Yes  Family currently present? Yes    BACKGROUND  Does the patient have a cognitive impairment or developmental disability? No  Allergies: No Known Allergies.   Social demographics are   Social History     Social History     Marital status: Single     Spouse name: N/A     Number of children: N/A     Years of education: N/A     Social History Main Topics     Smoking status:  Former Smoker     Packs/day: 0.25     Years: 7.00     Quit date: 5/26/2017     Smokeless tobacco: None     Alcohol use No     Drug use: No     Sexual activity: Not Asked     Other Topics Concern     None     Social History Narrative        ASSESSMENT  Labs results   Labs Ordered and Resulted from Time of ED Arrival Up to the Time of Departure from the ED   DRUG ABUSE SCREEN 6 CHEM DEP URINE ([facility])      Imaging Studies: No results found for this or any previous visit (from the past 24 hour(s)).   Most recent vital signs /62  Temp 96.5  F (35.8  C) (Oral)  Resp 16  Wt 70.9 kg (156 lb 6 oz)  SpO2 100%  Breastfeeding? No  BMI 24.49 kg/m2   Abnormal labs/tests/findings requiring intervention:---   Pain control: pt had none  Nausea control: pt had none    RECOMMENDATION  Are any infection precautions needed (MRSA, VRE, etc.)? No If yes, what infection? --  ---  Does the patient have mobility issues? independently. If yes, what device does the pt use? ---  ---  Is patient on 72 hour hold or commitment? No If on 72 hour hold, have hold and rights been given to patient? N/A  Are admitting orders written if after 10 p.m. ?N/A  Tasks needing to be completed:---     Mahsa Yap   [facility]-- [phone] [facility]   [phone] [facility]

## 2018-06-22 NOTE — ED PROVIDER NOTES
History     Chief Complaint   Patient presents with     Addiction Problem     Uses IV heroin. Uses approximately one gram IV. Last use 0400. Not sleeping for 3 days. Homeless. Stayed outside. Looking for detox. Very sleepy in triage.      The history is provided by the patient.     Yamel Hernandez is a 22 year old female who is here accompanied by mother. Patient is interested in detox. She has been abusing heroin IV up to 1 gm daily. She last used at 3 AM this morning. She reports having withdrawal symptoms as noted in the Review of Systems. She denies using other drugs. She denies any abscess or infection where she is injecting. She has been homeless. This has disrupted her sleep, in addition to her drug abuse cycle. Appetite has been poor. Patient has not slept for 2-3 days. She now feels tired and sleepy. She denies overdosing or using excessive drugs. There is no thought disorder or psychosis. Patient is interested in detox and subsequent treatment.    PERSONAL MEDICAL HISTORY  Past Medical History:   Diagnosis Date     Substance abuse     IV heroin use.      PAST SURGICAL HISTORY  History reviewed. No pertinent surgical history.  FAMILY HISTORY  No family history on file.  SOCIAL HISTORY  Social History   Substance Use Topics     Smoking status: Former Smoker     Packs/day: 0.25     Years: 7.00     Quit date: 5/26/2017     Smokeless tobacco: Not on file     Alcohol use No     MEDICATIONS  No current facility-administered medications for this encounter.      Current Outpatient Prescriptions   Medication     etonogestrel (IMPLANON/NEXPLANON) 68 MG IMPL     ALLERGIES  No Known Allergies      I have reviewed the Medications, Allergies, Past Medical and Surgical History, and Social History in the Epic system.    Review of Systems   Constitutional: Positive for activity change, appetite change, chills and diaphoresis.   HENT: Negative.    Eyes: Negative.    Respiratory: Negative.    Cardiovascular: Negative.     Gastrointestinal: Negative.    Endocrine: Negative.    Genitourinary: Negative.    Musculoskeletal: Positive for arthralgias and myalgias.   Skin: Negative.    Neurological: Negative.    Hematological: Negative.    Psychiatric/Behavioral: Positive for decreased concentration and sleep disturbance. Negative for hallucinations and suicidal ideas. The patient is not nervous/anxious and is not hyperactive.    All other systems reviewed and are negative.      Physical Exam   BP: 116/62  Heart Rate: 100  Temp: 96.5  F (35.8  C)  Resp: 16  Weight: 70.9 kg (156 lb 6 oz)  SpO2: 100 %      Physical Exam   Constitutional: She appears well-developed.   HENT:   Head: Normocephalic.   Eyes: Pupils are equal, round, and reactive to light.   Neck: Normal range of motion.   Cardiovascular: Normal rate.    Pulmonary/Chest: Effort normal.   Abdominal: Soft.   Musculoskeletal: Normal range of motion.   Neurological: She is alert.   Skin: Skin is warm.   Psychiatric: She has a normal mood and affect. Her speech is normal and behavior is normal. Judgment and thought content normal. She is not agitated, not aggressive, not hyperactive, not actively hallucinating and not combative. Thought content is not paranoid and not delusional. Cognition and memory are normal. She expresses no homicidal and no suicidal ideation.   Nursing note and vitals reviewed.      ED Course     ED Course     Procedures    Labs Ordered and Resulted from Time of ED Arrival Up to the Time of Departure from the ED - No data to display         Assessments & Plan (with Medical Decision Making)   Patient with opiate dependence who is going through withdrawal after last use 15 hours ago. She would like help with stopping her cycle of addiction. She has an available bed for detox and wants to get into detox. She is medically and psychiatrically cleared.    I have reviewed the nursing notes.    I have reviewed the findings, diagnosis, plan and need for follow up with the  patient.    New Prescriptions    No medications on file       Final diagnoses:   Opioid dependence with withdrawal (H)       6/22/2018   [facility], [place], EMERGENCY DEPARTMENT     Salomón Hunter MD  06/22/18 [id number]

## 2018-06-22 NOTE — IP AVS SNAPSHOT
[facility]    [address] [place] [id number]    Phone:  [phone]                                       After Visit Summary   6/22/2018    Yamel Hernandez    MRN: [id number]           After Visit Summary Signature Page     I have received my discharge instructions, and my questions have been answered. I have discussed any challenges I see with this plan with the nurse or doctor.    ..........................................................................................................................................  Patient/Patient Representative Signature      ..........................................................................................................................................  Patient Representative Print Name and Relationship to Patient    ..................................................               ................................................  Date                                            Time    ..........................................................................................................................................  Reviewed by Signature/Title    ...................................................              ..............................................  Date                                                            Time

## 2018-06-23 LAB
ALBUMIN SERPL-MCNC: 3.4 G/DL (ref 3.4–5)
ALP SERPL-CCNC: 179 U/L (ref 40–150)
ALT SERPL W P-5'-P-CCNC: 323 U/L (ref 0–50)
ANION GAP SERPL CALCULATED.3IONS-SCNC: 8 MMOL/L (ref 3–14)
AST SERPL W P-5'-P-CCNC: 202 U/L (ref 0–45)
BASOPHILS # BLD AUTO: 0 10E9/L (ref 0–0.2)
BASOPHILS NFR BLD AUTO: 0.4 %
BILIRUB SERPL-MCNC: 0.5 MG/DL (ref 0.2–1.3)
BUN SERPL-MCNC: 7 MG/DL (ref 7–30)
CALCIUM SERPL-MCNC: 8.6 MG/DL (ref 8.5–10.1)
CHLORIDE SERPL-SCNC: 112 MMOL/L (ref 94–109)
CO2 SERPL-SCNC: 25 MMOL/L (ref 20–32)
CREAT SERPL-MCNC: 0.55 MG/DL (ref 0.52–1.04)
DIFFERENTIAL METHOD BLD: ABNORMAL
EOSINOPHIL # BLD AUTO: 0 10E9/L (ref 0–0.7)
EOSINOPHIL NFR BLD AUTO: 0.7 %
ERYTHROCYTE [DISTWIDTH] IN BLOOD BY AUTOMATED COUNT: 18.3 % (ref 10–15)
GFR SERPL CREATININE-BSD FRML MDRD: >90 ML/MIN/1.7M2
GLUCOSE SERPL-MCNC: 102 MG/DL (ref 70–99)
HCT VFR BLD AUTO: 32.7 % (ref 35–47)
HGB BLD-MCNC: 9.7 G/DL (ref 11.7–15.7)
IMM GRANULOCYTES # BLD: 0 10E9/L (ref 0–0.4)
IMM GRANULOCYTES NFR BLD: 0.2 %
LYMPHOCYTES # BLD AUTO: 1.8 10E9/L (ref 0.8–5.3)
LYMPHOCYTES NFR BLD AUTO: 32.8 %
MCH RBC QN AUTO: 20 PG (ref 26.5–33)
MCHC RBC AUTO-ENTMCNC: 29.7 G/DL (ref 31.5–36.5)
MCV RBC AUTO: 67 FL (ref 78–100)
MONOCYTES # BLD AUTO: 0.3 10E9/L (ref 0–1.3)
MONOCYTES NFR BLD AUTO: 5.4 %
NEUTROPHILS # BLD AUTO: 3.3 10E9/L (ref 1.6–8.3)
NEUTROPHILS NFR BLD AUTO: 60.5 %
NRBC # BLD AUTO: 0 10*3/UL
NRBC BLD AUTO-RTO: 0 /100
PLATELET # BLD AUTO: 406 10E9/L (ref 150–450)
POTASSIUM SERPL-SCNC: 4 MMOL/L (ref 3.4–5.3)
PROT SERPL-MCNC: 8.5 G/DL (ref 6.8–8.8)
RBC # BLD AUTO: 4.86 10E12/L (ref 3.8–5.2)
SODIUM SERPL-SCNC: 145 MMOL/L (ref 133–144)
WBC # BLD AUTO: 5.4 10E9/L (ref 4–11)

## 2018-06-23 PROCEDURE — 86706 HEP B SURFACE ANTIBODY: CPT | Performed by: PSYCHIATRY & NEUROLOGY

## 2018-06-23 PROCEDURE — 87340 HEPATITIS B SURFACE AG IA: CPT | Performed by: PSYCHIATRY & NEUROLOGY

## 2018-06-23 PROCEDURE — 99221 1ST HOSP IP/OBS SF/LOW 40: CPT | Performed by: PHYSICIAN ASSISTANT

## 2018-06-23 PROCEDURE — 36415 COLL VENOUS BLD VENIPUNCTURE: CPT | Performed by: PSYCHIATRY & NEUROLOGY

## 2018-06-23 PROCEDURE — 82306 VITAMIN D 25 HYDROXY: CPT | Performed by: PSYCHIATRY & NEUROLOGY

## 2018-06-23 PROCEDURE — 87389 HIV-1 AG W/HIV-1&-2 AB AG IA: CPT | Performed by: PSYCHIATRY & NEUROLOGY

## 2018-06-23 PROCEDURE — 86803 HEPATITIS C AB TEST: CPT | Performed by: PSYCHIATRY & NEUROLOGY

## 2018-06-23 PROCEDURE — 99207 ZZC CONSULT E&M CHANGED TO INITIAL LEVEL: CPT | Performed by: PHYSICIAN ASSISTANT

## 2018-06-23 PROCEDURE — 80053 COMPREHEN METABOLIC PANEL: CPT | Performed by: PSYCHIATRY & NEUROLOGY

## 2018-06-23 PROCEDURE — 25000132 ZZH RX MED GY IP 250 OP 250 PS 637: Performed by: PSYCHIATRY & NEUROLOGY

## 2018-06-23 PROCEDURE — 12800012 ZZH R&B CD MH INTERMEDIATE ADULT

## 2018-06-23 PROCEDURE — 85025 COMPLETE CBC W/AUTO DIFF WBC: CPT | Performed by: PSYCHIATRY & NEUROLOGY

## 2018-06-23 RX ORDER — PRAZOSIN HYDROCHLORIDE 1 MG/1
1 CAPSULE ORAL AT BEDTIME
Status: DISCONTINUED | OUTPATIENT
Start: 2018-06-23 | End: 2018-07-02 | Stop reason: HOSPADM

## 2018-06-23 RX ADMIN — BUPRENORPHINE HCL 4 MG: 2 TABLET SUBLINGUAL at 20:31

## 2018-06-23 RX ADMIN — BUPRENORPHINE HCL 4 MG: 2 TABLET SUBLINGUAL at 11:59

## 2018-06-23 RX ADMIN — CLONIDINE HYDROCHLORIDE 0.1 MG: 0.1 TABLET ORAL at 03:22

## 2018-06-23 RX ADMIN — CLONIDINE HYDROCHLORIDE 0.1 MG: 0.1 TABLET ORAL at 16:35

## 2018-06-23 RX ADMIN — HYDROXYZINE HYDROCHLORIDE 25 MG: 25 TABLET ORAL at 16:35

## 2018-06-23 RX ADMIN — NICOTINE POLACRILEX 4 MG: 2 GUM, CHEWING ORAL at 16:35

## 2018-06-23 RX ADMIN — IBUPROFEN 600 MG: 600 TABLET ORAL at 03:24

## 2018-06-23 RX ADMIN — IBUPROFEN 600 MG: 600 TABLET ORAL at 16:35

## 2018-06-23 RX ADMIN — PRAZOSIN HYDROCHLORIDE 1 MG: 1 CAPSULE ORAL at 22:30

## 2018-06-23 ASSESSMENT — ACTIVITIES OF DAILY LIVING (ADL)
DRESS: INDEPENDENT
GROOMING: INDEPENDENT
ORAL_HYGIENE: INDEPENDENT

## 2018-06-23 NOTE — CONSULTS
"  [facility]  Internal Medicine Consult     Yamel Hernandez MRN# [id number]   Age: 22 year old YOB: 1995     Date of Admission: 6/22/2018  Date of Consult:  6/23/2018    Primary Care Provider: [facility]    Requesting Service: Psychiatry  Reason for Consult: General Medical Evaluation         Assessment and Recommendations:   Yamel Hernandez is a 22 year old female w/ no significant past medical history who was admitted to [facility] station 3A seeking detox from heroin.     # Opiate abuse, acute withdrawal. IV heroin use. Management per primary team, psychiatry. Labs remarkable for Elevated ALT (323) AST (202) and Alk phos (179), denies abdominal pain. Also with mild hypernatremia, likely hypovolemic given poor PO intake.  - Agree with subutex  - Agree with hep B/C and HIV testing per primary team, please notify if in need of assistance with results  - Encourage PO intake  - Will repeat LFT's 6/25 to ensure downward trend with abstinence.     Microcytic anemia. Chronic per care everywhere. Would recommend further w/u for iron deficiency in outpatient setting.  - STAT Hgb if s/s of acute bleeding         IM will follow for repeat LFTs 6/25.       Lizet Crawford PA-C  Hospitalist Service  [phone]           Chief Complaint:   Heroin withdrawal         History of Present Illness:   Yamel Hernandez is a 22 year old female w/ no significant past medical history who was admitted to [facility] station 3A seeking detox from heroin.   Per ED note:  \"She has been abusing heroin IV up to 1 gm daily. She last used at 3 AM this morning. She reports having withdrawal symptoms as noted in the Review of Systems. She denies using other drugs. She denies any abscess or infection where she is injecting. She has been homeless. This has disrupted her sleep, in addition to her drug abuse cycle. Appetite has been poor. Patient has not slept for 2-3 days. She now feels tired and sleepy. She denies " "overdosing or using excessive drugs. There is no thought disorder or psychosis. Patient is interested in detox and subsequent treatment\"  Currently complains of body aches, hot/cold, restlessness and fatigue. Denies abdominal pain, fevers or chills. No chest pain, shortness of breath or difficulty breathing. Denies sharing needles. States that she uses clean needles.          Review of Systems:   A 10 point review of systems was performed and is negative unless otherwise noted in HPI.          Past Medical History:     Past Medical History:   Diagnosis Date     Substance abuse     IV heroin use.             Past Surgical History:    History reviewed. No pertinent surgical history.          Family History:   No family history on file.          Social History:     Social History   Substance Use Topics     Smoking status: Former Smoker     Packs/day: 0.25     Years: 7.00     Quit date: 5/26/2017     Smokeless tobacco: Not on file     Alcohol use No             Medications:     Current Facility-Administered Medications   Medication     acetaminophen (TYLENOL) tablet 650 mg     alum & mag hydroxide-simethicone (MYLANTA ES/MAALOX  ES) suspension 30 mL     bisacodyl (DULCOLAX) Suppository 10 mg     buprenorphine (SUBUTEX) sublingual tablet 4 mg     cloNIDine (CATAPRES) tablet 0.1 mg     hydrOXYzine (ATARAX) tablet 25 mg     ibuprofen (ADVIL/MOTRIN) tablet 600 mg     loperamide (IMODIUM) capsule 2 mg     magnesium hydroxide (MILK OF MAGNESIA) suspension 30 mL     nicotine polacrilex (NICORETTE) gum 2-4 mg     ondansetron (ZOFRAN-ODT) ODT tab 4 mg     traZODone (DESYREL) tablet 50 mg            Allergies:   No Known Allergies          Physical Exam:   /59  Pulse 96  Temp 98  F (36.7  C) (Tympanic)  Resp 16  Ht 1.727 m (5' 8\")  Wt 65.3 kg (144 lb)  SpO2 100%  Breastfeeding? No  BMI 21.9 kg/m2   GENERAL: Alert and oriented x 3. Agitated.   HEENT: Anicteric sclera. Mucous membranes moist.   CV: RRR. S1, S2. No " murmurs appreciated.   RESPIRATORY: Effort normal on room air. Lungs CTAB with no wheezing, rales, rhonchi.   GI: Abdomen soft and non distended with normoactive bowel sounds present in all quadrants. No tenderness, rebound, guarding.   MUSCULOSKELETAL: No joint swelling or tenderness. Moves all extremities.   NEUROLOGICAL: No focal deficits. Strength 5/5 bilaterally in upper and lower extremities.   EXTREMITIES: No peripheral edema. Intact bilateral pedal pulses.   SKIN: No jaundice. No rashes.          Labs:   CBC:  Recent Labs   Lab Test  06/23/18   0819   WBC  5.4   RBC  4.86   HGB  9.7*   HCT  32.7*   MCV  67*   MCH  20.0*   MCHC  29.7*   RDW  18.3*   PLT  406       CMP:  Recent Labs   Lab Test  06/23/18   0819   NA  145*   POTASSIUM  4.0   CHLORIDE  112*   ALEIDA  8.6   CO2  25   BUN  7   CR  0.55   GLC  102*   AST  202*   ALT  323*   BILITOTAL  0.5   ALBUMIN  3.4   PROTTOTAL  8.5   ALKPHOS  179*           Lizet Crawford PA-C  Internal Medicine [facility] Hospitalist   [facility]   Pager: [phone]

## 2018-06-23 NOTE — PROGRESS NOTES
Pt came out of her room in her bra and underpants and urinated on the floor.  When she saw staff coming down the boyd she pulled up her underpants and went into the bathroom.  When asked if she needed help.  She said no. Pt continues to have bizzaire behavior.
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
## 2018-06-23 NOTE — PROGRESS NOTES
06/22/18 1944   Patient Belongings   Did you bring any home meds/supplements to the hospital?  Yes   Disposition of meds  Sent to security/pharmacy per site process   Patient Belongings clothing;shoes;purse;glasses;other (see comments)   Disposition of Belongings Locker   Belongings Search Yes   Clothing Search Yes   Second Staff Neva and Silvia Delarosa   General Info Comment see notes   Storage bin   Multiple cosmetic bag, personal care/supplies items(lotions,shampoo, conditioner..etc), loose change, short shorts, tank tops, crop-tops , duffel bag, black backpack, tan backpack, shoes with laces, shoes with metal buckle, belt,   Sharps: Nail clipper, tweezers, lighter , broken mirror, razor.   Locked drawer:  No cell phone, No  or wallet.  SECURITY/[place]  Bullet    No drivers license or No visa cards.   A             Admission:  I am responsible for any personal items that are not sent to the safe or pharmacy.  [facility] is not responsible for loss, theft or damage of any property in my possession.  Signature:  _________________________________ Date: _______  Time: _____                                              Staff Signature:  ____________________________ Date: ________  Time: _____      2nd Staff person, if patient is unable/unwilling to sign:    Signature: ________________________________ Date: ________  Time: _____   Discharge:  [facility] has returned all of my personal belongings:  Signature: _________________________________ Date: ________  Time: _____                                          Staff Signature:  ____________________________ Date: ________  Time: _____

## 2018-06-23 NOTE — PROGRESS NOTES
Pt a wake coming out in her room only her bra and scrub pants on. Will not put on a shirt.  She appears to still be under the influence  is groggy and restless. There is food and juice spilled on her floor. Pt has come out several times and gets more food and spills it. She randomly bumps her head against her door accidentally. When ambulating pt weaves back and forth. Unable to assess for heroin  withdrawal.  Pt wont open her eyes all the way.  She wont answer questions or sit still for vitals because of her body aches.  House Officer (Dr. Forrest Lima) called about administering first dose of subutex.  Pt stated she did not know when her last use was.  She said maybe 4 o'clock.  House officer said to hold and wait for day provider to assess with safer time frame. Clonidine 0.1 mg and Ibuprofen 600 mg administered for discomfort.  Will continue to monitor.

## 2018-06-24 LAB
IRON SATN MFR SERPL: 4 % (ref 15–46)
IRON SERPL-MCNC: 18 UG/DL (ref 35–180)
TIBC SERPL-MCNC: 487 UG/DL (ref 240–430)

## 2018-06-24 PROCEDURE — 83540 ASSAY OF IRON: CPT | Performed by: PSYCHIATRY & NEUROLOGY

## 2018-06-24 PROCEDURE — 36415 COLL VENOUS BLD VENIPUNCTURE: CPT | Performed by: PSYCHIATRY & NEUROLOGY

## 2018-06-24 PROCEDURE — 83550 IRON BINDING TEST: CPT | Performed by: PSYCHIATRY & NEUROLOGY

## 2018-06-24 PROCEDURE — 12800012 ZZH R&B CD MH INTERMEDIATE ADULT

## 2018-06-24 PROCEDURE — 25000132 ZZH RX MED GY IP 250 OP 250 PS 637: Performed by: PSYCHIATRY & NEUROLOGY

## 2018-06-24 RX ADMIN — CLONIDINE HYDROCHLORIDE 0.1 MG: 0.1 TABLET ORAL at 16:24

## 2018-06-24 RX ADMIN — PRAZOSIN HYDROCHLORIDE 1 MG: 1 CAPSULE ORAL at 22:22

## 2018-06-24 RX ADMIN — BUPRENORPHINE HCL 4 MG: 2 TABLET SUBLINGUAL at 20:33

## 2018-06-24 RX ADMIN — BUPRENORPHINE HCL 4 MG: 2 TABLET SUBLINGUAL at 09:39

## 2018-06-24 ASSESSMENT — ACTIVITIES OF DAILY LIVING (ADL)
GROOMING: INDEPENDENT
DRESS: INDEPENDENT
ORAL_HYGIENE: INDEPENDENT

## 2018-06-25 LAB
ALBUMIN SERPL-MCNC: 3 G/DL (ref 3.4–5)
ALP SERPL-CCNC: 147 U/L (ref 40–150)
ALT SERPL W P-5'-P-CCNC: 212 U/L (ref 0–50)
ANION GAP SERPL CALCULATED.3IONS-SCNC: 6 MMOL/L (ref 3–14)
AST SERPL W P-5'-P-CCNC: 106 U/L (ref 0–45)
BILIRUB SERPL-MCNC: 0.2 MG/DL (ref 0.2–1.3)
BUN SERPL-MCNC: 6 MG/DL (ref 7–30)
CALCIUM SERPL-MCNC: 8.1 MG/DL (ref 8.5–10.1)
CHLORIDE SERPL-SCNC: 109 MMOL/L (ref 94–109)
CO2 SERPL-SCNC: 28 MMOL/L (ref 20–32)
CREAT SERPL-MCNC: 0.52 MG/DL (ref 0.52–1.04)
DEPRECATED CALCIDIOL+CALCIFEROL SERPL-MC: 34 UG/L (ref 20–75)
GFR SERPL CREATININE-BSD FRML MDRD: >90 ML/MIN/1.7M2
GLUCOSE SERPL-MCNC: 96 MG/DL (ref 70–99)
HBV SURFACE AB SERPL IA-ACNC: 0 M[IU]/ML
HBV SURFACE AG SERPL QL IA: NONREACTIVE
HCV AB SERPL QL IA: REACTIVE
HIV 1+2 AB+HIV1 P24 AG SERPL QL IA: NONREACTIVE
POTASSIUM SERPL-SCNC: 3.8 MMOL/L (ref 3.4–5.3)
PROT SERPL-MCNC: 7.5 G/DL (ref 6.8–8.8)
SODIUM SERPL-SCNC: 143 MMOL/L (ref 133–144)

## 2018-06-25 PROCEDURE — 36415 COLL VENOUS BLD VENIPUNCTURE: CPT | Performed by: PHYSICIAN ASSISTANT

## 2018-06-25 PROCEDURE — 25000132 ZZH RX MED GY IP 250 OP 250 PS 637: Performed by: NURSE PRACTITIONER

## 2018-06-25 PROCEDURE — 12800012 ZZH R&B CD MH INTERMEDIATE ADULT

## 2018-06-25 PROCEDURE — 25000132 ZZH RX MED GY IP 250 OP 250 PS 637: Performed by: PSYCHIATRY & NEUROLOGY

## 2018-06-25 PROCEDURE — 80053 COMPREHEN METABOLIC PANEL: CPT | Performed by: PHYSICIAN ASSISTANT

## 2018-06-25 PROCEDURE — 99231 SBSQ HOSP IP/OBS SF/LOW 25: CPT | Performed by: PSYCHIATRY & NEUROLOGY

## 2018-06-25 RX ORDER — FERROUS SULFATE 325(65) MG
325 TABLET ORAL 2 TIMES DAILY
Status: DISCONTINUED | OUTPATIENT
Start: 2018-06-25 | End: 2018-07-02 | Stop reason: HOSPADM

## 2018-06-25 RX ADMIN — IBUPROFEN 600 MG: 600 TABLET ORAL at 16:13

## 2018-06-25 RX ADMIN — BUPRENORPHINE HCL 4 MG: 2 TABLET SUBLINGUAL at 08:06

## 2018-06-25 RX ADMIN — CLONIDINE HYDROCHLORIDE 0.1 MG: 0.1 TABLET ORAL at 16:13

## 2018-06-25 RX ADMIN — BUPRENORPHINE HCL 4 MG: 2 TABLET SUBLINGUAL at 20:31

## 2018-06-25 RX ADMIN — NICOTINE POLACRILEX 4 MG: 2 GUM, CHEWING ORAL at 16:13

## 2018-06-25 RX ADMIN — FERROUS SULFATE TAB 325 MG (65 MG ELEMENTAL FE) 325 MG: 325 (65 FE) TAB at 20:31

## 2018-06-25 RX ADMIN — HYDROXYZINE HYDROCHLORIDE 25 MG: 25 TABLET ORAL at 20:31

## 2018-06-25 RX ADMIN — FERROUS SULFATE TAB 325 MG (65 MG ELEMENTAL FE) 325 MG: 325 (65 FE) TAB at 08:06

## 2018-06-25 ASSESSMENT — ACTIVITIES OF DAILY LIVING (ADL)
GROOMING: INDEPENDENT
ORAL_HYGIENE: INDEPENDENT
GROOMING: INDEPENDENT
DRESS: INDEPENDENT
LAUNDRY: WITH SUPERVISION

## 2018-06-25 NOTE — PROGRESS NOTES
Writer met with Pt to discuss aftercare plans.  She wants to go to [facility].  She is able to stay with mother until she is admitted there.  Discussed suboxone maintanance she is willing to go to [facility].

## 2018-06-25 NOTE — PROGRESS NOTES
Writer called Kimberley at [facility] to schedule suboxen f/u appointment. Left message to contact Mary Ann MALIK

## 2018-06-25 NOTE — PROGRESS NOTES
"[facility], [place]   Psychiatric Progress Note    Interim history   This is a 22 year old female with opiate use dx severe  ptsd .Pt seen in rounds. Patient's mood is good   Energy Level:MODERATE  Sleep:No concerns, sleeps well through night  Appetite:normal motivation interest good   deneid any Suicidal/homicidal ideation/plan intent.  deneid any psychosis  No prior suicde attempts  No access to gun  Pt is in detox  Pt mssa/cows score are monitered  Tolerating meds and has no side effects.              Medications:     Current Facility-Administered Medications   Medication     acetaminophen (TYLENOL) tablet 650 mg     alum & mag hydroxide-simethicone (MYLANTA ES/MAALOX  ES) suspension 30 mL     bisacodyl (DULCOLAX) Suppository 10 mg     buprenorphine (SUBUTEX) sublingual tablet 4 mg     cloNIDine (CATAPRES) tablet 0.1 mg     ferrous sulfate (IRON) tablet 325 mg     hydrOXYzine (ATARAX) tablet 25 mg     ibuprofen (ADVIL/MOTRIN) tablet 600 mg     loperamide (IMODIUM) capsule 2 mg     magnesium hydroxide (MILK OF MAGNESIA) suspension 30 mL     nicotine polacrilex (NICORETTE) gum 2-4 mg     ondansetron (ZOFRAN-ODT) ODT tab 4 mg     prazosin (MINIPRESS) capsule 1 mg     traZODone (DESYREL) tablet 50 mg             Allergies:   No Known Allergies         Psychiatric Examination:   Blood pressure 108/66, pulse 87, temperature 97.9  F (36.6  C), resp. rate 16, height 1.727 m (5' 8\"), weight 65.3 kg (144 lb), SpO2 100 %, not currently breastfeeding.  Weight is 144 lbs 0 oz  Body mass index is 21.9 kg/(m^2).    Appearance:  awake, alert and adequately groomed  Attitude:  cooperative  Eye Contact:  good  Mood:  better  Affect:  appropriate and in normal range and mood congruent  Speech:  clear, coherent rate /rhythm are good  Psychomotor Behavior:  no evidence of tardive dyskinesia, dystonia, or tics and intact station, gait and muscle tone  Throught Process:  logical  Associations:  no loose " associations  Thought Content:  no evidence of suicidal ideation or homicidal ideation, no evidence of psychotic thought, no auditory hallucinations present and no visual hallucinations present  Insight:  good  Judgement:  intact  Oriented to:  time, person, and place  Attention Span and Concentration:  intact  Recent and Remote Memory:  intact  Language fund of knowledge are adequate         Labs:     No results found for: NTBNPI, NTBNP  Lab Results   Component Value Date    WBC 5.4 06/23/2018    HGB 9.7 (L) 06/23/2018    HCT 32.7 (L) 06/23/2018    MCV 67 (L) 06/23/2018     06/23/2018     No results found for: TSH         Dx opiate use dx severe  ptsd   Plan   continue subutex 2mg sl qid     Laboratory/Imaging: reviewed with patient   Consults: internal medicine consult reviewed  Patient will be treated in therapeutic milieu with appropriate individual and group therapies as described.      Medical diagnoses to be addressed this admission:    Plan:  # Opiate abuse, acute withdrawal. IV heroin use. Management per primary team, psychiatry. Labs remarkable for Elevated ALT (323) AST (202) and Alk phos (179), denies abdominal pain. Also with mild hypernatremia, likely hypovolemic given poor PO intake.  - Agree with subutex  - Agree with hep B/C and HIV testing per primary team, please notify if in need of assistance with results  - Encourage PO intake  - Will repeat LFT's 6/25 to ensure downward trend with abstinence.      Microcytic anemia. Chronic per care everywhere. Would recommend further w/u for iron deficiency in outpatient setting.  - STAT Hgb if s/s of acute bleeding       Legal Status: voluntary    Safety Assessment:   Checks:  15 min  Precautions: withdrawal precautions  Pt has not required locked seclusion or restraints in the past 24 hours to maintain safety, please refer to RN documentation for further details.  Discussed with patient many issues of addiction,triggers, relapse, and establishing a  solid recovery program.  Able to give informed consent:  YES   Discussed Risks/Benefits/Side Effects/Alternatives: YES    After discussion of the indications, risks, benefits, alternatives and consequences of no treatment, the patient elects to complete detox and do trt.

## 2018-06-25 NOTE — PROGRESS NOTES
"Brief Medicine Note:    Patient admitted with IV heroin abuse. Had asymptomatic elevated LFTs on admission. Hep B, C and HIV per primary team. Repeat LFTs today indicate improvement. No need for further trending.   - Follow with PCP within 2-4 weeks to ensure normalization.     Patient also with microcytic anemia on admission. Per consult note, \"Chronic per care everywhere.\" W/u this admission reveals iron deficiency.   - Start oral iron BID    Kimberley Ferguson DNP, CNP  [facility] Hospitalist Service      "

## 2018-06-25 NOTE — PROGRESS NOTES
Behavioral Team Discussion: (6/25/2018)    Continued Stay Criteria/Rationale: Patient admitted for opiate Use Disorder.  Plan: The following services will be provided to the patient; psychiatric assessment, medication management, therapeutic milieu, individual and group support, and skills groups.   Participants: 3A Provider: Dr. Yeimy Valiente MD; 3A RN's: Danyelle Frank, RN; 3A CM's: Mary Ann Pickett .  Summary/Recommendation: Providers will assess today for treatment recommendations, discharge planning, and aftercare plans. CM will meet with pt for discharge planning.   Medical/Physical: Deferred (see medical notes).  Precautions:   Behavioral Orders   Procedures     Code 1 - Restrict to Unit     Routine Programming     As clinically indicated     Status 15     Every 15 minutes.     Withdrawal precautions     Rationale for change in precautions or plan: N/A  Progress: No Change.

## 2018-06-26 PROCEDURE — 99231 SBSQ HOSP IP/OBS SF/LOW 25: CPT | Performed by: PSYCHIATRY & NEUROLOGY

## 2018-06-26 PROCEDURE — 12800012 ZZH R&B CD MH INTERMEDIATE ADULT

## 2018-06-26 PROCEDURE — 25000132 ZZH RX MED GY IP 250 OP 250 PS 637: Performed by: NURSE PRACTITIONER

## 2018-06-26 PROCEDURE — 25000132 ZZH RX MED GY IP 250 OP 250 PS 637: Performed by: PSYCHIATRY & NEUROLOGY

## 2018-06-26 RX ORDER — TRAZODONE HYDROCHLORIDE 50 MG/1
50 TABLET, FILM COATED ORAL
Qty: 30 TABLET | Refills: 0 | Status: SHIPPED | OUTPATIENT
Start: 2018-06-26

## 2018-06-26 RX ORDER — PRAZOSIN HYDROCHLORIDE 1 MG/1
1 CAPSULE ORAL AT BEDTIME
Qty: 30 CAPSULE | Refills: 0 | Status: SHIPPED | OUTPATIENT
Start: 2018-06-26

## 2018-06-26 RX ORDER — FERROUS SULFATE 325(65) MG
325 TABLET ORAL 2 TIMES DAILY
Qty: 100 TABLET | Refills: 0 | Status: SHIPPED | OUTPATIENT
Start: 2018-06-26

## 2018-06-26 RX ORDER — HYDROXYZINE HYDROCHLORIDE 25 MG/1
25 TABLET, FILM COATED ORAL EVERY 8 HOURS PRN
Qty: 30 TABLET | Refills: 0 | Status: SHIPPED | OUTPATIENT
Start: 2018-06-26

## 2018-06-26 RX ORDER — BUPRENORPHINE AND NALOXONE 8; 2 MG/1; MG/1
1 FILM, SOLUBLE BUCCAL; SUBLINGUAL DAILY
Qty: 7 FILM | Refills: 0 | Status: SHIPPED | OUTPATIENT
Start: 2018-06-26

## 2018-06-26 RX ADMIN — IBUPROFEN 600 MG: 600 TABLET ORAL at 16:18

## 2018-06-26 RX ADMIN — PRAZOSIN HYDROCHLORIDE 1 MG: 1 CAPSULE ORAL at 20:26

## 2018-06-26 RX ADMIN — FERROUS SULFATE TAB 325 MG (65 MG ELEMENTAL FE) 325 MG: 325 (65 FE) TAB at 08:56

## 2018-06-26 RX ADMIN — FERROUS SULFATE TAB 325 MG (65 MG ELEMENTAL FE) 325 MG: 325 (65 FE) TAB at 20:26

## 2018-06-26 RX ADMIN — NICOTINE POLACRILEX 4 MG: 2 GUM, CHEWING ORAL at 16:18

## 2018-06-26 RX ADMIN — BUPRENORPHINE HCL 4 MG: 2 TABLET SUBLINGUAL at 20:26

## 2018-06-26 RX ADMIN — HYDROXYZINE HYDROCHLORIDE 25 MG: 25 TABLET ORAL at 16:18

## 2018-06-26 RX ADMIN — BUPRENORPHINE HCL 4 MG: 2 TABLET SUBLINGUAL at 08:56

## 2018-06-26 ASSESSMENT — ACTIVITIES OF DAILY LIVING (ADL)
GROOMING: INDEPENDENT
DRESS: INDEPENDENT
ORAL_HYGIENE: INDEPENDENT
GROOMING: INDEPENDENT

## 2018-06-26 NOTE — PROGRESS NOTES
Pt states she continues to work on paperwork. Directed Pt to skip any questions she found too difficult to answer and Pt acknowledged.

## 2018-06-26 NOTE — PLAN OF CARE
"Problem: Substance Withdrawal  Goal: Substance Withdrawal  Signs and symptoms of listed problems will be absent or manageable.   Outcome: No Change  Yamel was largely isolative to her room all shift, mostly sleeping. She is still endorsing withdrawal symptoms: \"I have aches, sweating, body aches, everything.\" She was pressed on what to do after detox, if she wants treatment or not, and she stated she was unable to answer the question. She acknowledged wanting sobriety, and stated \"I guess \" to wanting treatment. She has begun on her paperwork with much encouragement.       "

## 2018-06-26 NOTE — PROGRESS NOTES
"[facility], [place]   Psychiatric Progress Note    Interim history   This is a 22 year old female with opiate use dx severe  ptsd .Pt seen in rounds. Patient's mood is good   Energy Level:MODERATE  Sleep:No concerns, sleeps well through night  Appetite:normal motivation interest good   deneid any Suicidal/homicidal ideation/plan intent.  deneid any psychosis  No prior suicde attempts  No access to gun  Pt is in detox  Pt mssa/cows score are monitered  Tolerating meds and has no side effects.              Medications:     Current Facility-Administered Medications   Medication     acetaminophen (TYLENOL) tablet 650 mg     alum & mag hydroxide-simethicone (MYLANTA ES/MAALOX  ES) suspension 30 mL     bisacodyl (DULCOLAX) Suppository 10 mg     buprenorphine (SUBUTEX) sublingual tablet 4 mg     cloNIDine (CATAPRES) tablet 0.1 mg     ferrous sulfate (IRON) tablet 325 mg     hydrOXYzine (ATARAX) tablet 25 mg     ibuprofen (ADVIL/MOTRIN) tablet 600 mg     loperamide (IMODIUM) capsule 2 mg     magnesium hydroxide (MILK OF MAGNESIA) suspension 30 mL     nicotine polacrilex (NICORETTE) gum 2-4 mg     ondansetron (ZOFRAN-ODT) ODT tab 4 mg     prazosin (MINIPRESS) capsule 1 mg     traZODone (DESYREL) tablet 50 mg             Allergies:   No Known Allergies         Psychiatric Examination:   Blood pressure 104/58, pulse 93, temperature 98.1  F (36.7  C), temperature source Oral, resp. rate 16, height 1.727 m (5' 8\"), weight 65.3 kg (144 lb), SpO2 100 %, not currently breastfeeding.  Weight is 144 lbs 0 oz  Body mass index is 21.9 kg/(m^2).    Appearance:  awake, alert and adequately groomed  Attitude:  cooperative  Eye Contact:  good  Mood:  better  Affect:  appropriate and in normal range and mood congruent  Speech:  clear, coherent rate /rhythm are good  Psychomotor Behavior:  no evidence of tardive dyskinesia, dystonia, or tics and intact station, gait and muscle tone  Throught Process:  " logical  Associations:  no loose associations  Thought Content:  no evidence of suicidal ideation or homicidal ideation, no evidence of psychotic thought, no auditory hallucinations present and no visual hallucinations present  Insight:  good  Judgement:  intact  Oriented to:  time, person, and place  Attention Span and Concentration:  intact  Recent and Remote Memory:  intact  Language fund of knowledge are adequate         Labs:     No results found for: NTBNPI, NTBNP  Lab Results   Component Value Date    WBC 5.4 06/23/2018    HGB 9.7 (L) 06/23/2018    HCT 32.7 (L) 06/23/2018    MCV 67 (L) 06/23/2018     06/23/2018     No results found for: TSH         Dx opiate use dx severe  ptsd   Plan   continue subutex 2mg sl qid     Laboratory/Imaging: reviewed with patient   Consults: internal medicine consult reviewed  Patient will be treated in therapeutic milieu with appropriate individual and group therapies as described.      Medical diagnoses to be addressed this admission:    Plan:  # Opiate abuse, acute withdrawal. IV heroin use. Management per primary team, psychiatry. Labs remarkable for Elevated ALT (323) AST (202) and Alk phos (179), denies abdominal pain. Also with mild hypernatremia, likely hypovolemic given poor PO intake.  - Agree with subutex  - Agree with hep B/C and HIV testing per primary team, please notify if in need of assistance with results  - Encourage PO intake  - Will repeat LFT's 6/25 to ensure downward trend with abstinence.      Microcytic anemia. Chronic per care everywhere. Would recommend further w/u for iron deficiency in outpatient setting.  - STAT Hgb if s/s of acute bleeding       Legal Status: voluntary    Safety Assessment:   Checks:  15 min  Precautions: withdrawal precautions  Pt has not required locked seclusion or restraints in the past 24 hours to maintain safety, please refer to RN documentation for further details.  Discussed with patient many issues of  addiction,triggers, relapse, and establishing a solid recovery program.  Able to give informed consent:  YES   Discussed Risks/Benefits/Side Effects/Alternatives: YES    After discussion of the indications, risks, benefits, alternatives and consequences of no treatment, the patient elects to complete detox and do trt.

## 2018-06-27 PROCEDURE — 25000125 ZZHC RX 250: Performed by: CLINICAL NURSE SPECIALIST

## 2018-06-27 PROCEDURE — 25000132 ZZH RX MED GY IP 250 OP 250 PS 637: Performed by: NURSE PRACTITIONER

## 2018-06-27 PROCEDURE — 25000132 ZZH RX MED GY IP 250 OP 250 PS 637: Performed by: PSYCHIATRY & NEUROLOGY

## 2018-06-27 PROCEDURE — 12800012 ZZH R&B CD MH INTERMEDIATE ADULT

## 2018-06-27 RX ADMIN — NICOTINE POLACRILEX 4 MG: 2 GUM, CHEWING ORAL at 13:13

## 2018-06-27 RX ADMIN — BUPRENORPHINE HCL 4 MG: 2 TABLET SUBLINGUAL at 21:03

## 2018-06-27 RX ADMIN — FERROUS SULFATE TAB 325 MG (65 MG ELEMENTAL FE) 325 MG: 325 (65 FE) TAB at 21:03

## 2018-06-27 RX ADMIN — CLONIDINE HYDROCHLORIDE 0.1 MG: 0.1 TABLET ORAL at 12:39

## 2018-06-27 RX ADMIN — BUPRENORPHINE HCL 4 MG: 2 TABLET SUBLINGUAL at 08:15

## 2018-06-27 RX ADMIN — IBUPROFEN 600 MG: 600 TABLET ORAL at 22:01

## 2018-06-27 RX ADMIN — PRAZOSIN HYDROCHLORIDE 1 MG: 1 CAPSULE ORAL at 21:03

## 2018-06-27 RX ADMIN — FERROUS SULFATE TAB 325 MG (65 MG ELEMENTAL FE) 325 MG: 325 (65 FE) TAB at 08:15

## 2018-06-27 RX ADMIN — Medication: at 22:30

## 2018-06-27 RX ADMIN — ACETAMINOPHEN 650 MG: 325 TABLET, FILM COATED ORAL at 14:04

## 2018-06-27 RX ADMIN — NICOTINE POLACRILEX 4 MG: 2 GUM, CHEWING ORAL at 21:06

## 2018-06-27 RX ADMIN — IBUPROFEN 600 MG: 600 TABLET ORAL at 13:11

## 2018-06-27 RX ADMIN — HYDROXYZINE HYDROCHLORIDE 25 MG: 25 TABLET ORAL at 14:04

## 2018-06-27 ASSESSMENT — ACTIVITIES OF DAILY LIVING (ADL)
ORAL_HYGIENE: INDEPENDENT
GROOMING: INDEPENDENT
DRESS: INDEPENDENT

## 2018-06-27 NOTE — PROGRESS NOTES
Pt is scheduled to see Dr. Sampson at [facility] for Suboxone maintenance on Tuesday, July 5th at 2 pm.
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
## 2018-06-27 NOTE — PROGRESS NOTES
Met with Pt and completed assessment.  Call placed to [facility] for information regarding submitting for clinical review and funding.  Pt signed MARIELA for [facility] and program faxed for clinical review.  Pt is now requesting door to door transfer.

## 2018-06-28 PROCEDURE — 25000132 ZZH RX MED GY IP 250 OP 250 PS 637: Performed by: NURSE PRACTITIONER

## 2018-06-28 PROCEDURE — G0177 OPPS/PHP; TRAIN & EDUC SERV: HCPCS

## 2018-06-28 PROCEDURE — 99231 SBSQ HOSP IP/OBS SF/LOW 25: CPT | Performed by: PSYCHIATRY & NEUROLOGY

## 2018-06-28 PROCEDURE — 99231 SBSQ HOSP IP/OBS SF/LOW 25: CPT | Performed by: NURSE PRACTITIONER

## 2018-06-28 PROCEDURE — 25000132 ZZH RX MED GY IP 250 OP 250 PS 637: Performed by: PSYCHIATRY & NEUROLOGY

## 2018-06-28 PROCEDURE — 12800012 ZZH R&B CD MH INTERMEDIATE ADULT

## 2018-06-28 RX ADMIN — CLONIDINE HYDROCHLORIDE 0.1 MG: 0.1 TABLET ORAL at 16:31

## 2018-06-28 RX ADMIN — IBUPROFEN 600 MG: 600 TABLET ORAL at 22:32

## 2018-06-28 RX ADMIN — NICOTINE POLACRILEX 4 MG: 2 GUM, CHEWING ORAL at 20:17

## 2018-06-28 RX ADMIN — ACETAMINOPHEN 650 MG: 325 TABLET, FILM COATED ORAL at 20:17

## 2018-06-28 RX ADMIN — BUPRENORPHINE HCL 4 MG: 2 TABLET SUBLINGUAL at 20:17

## 2018-06-28 RX ADMIN — FERROUS SULFATE TAB 325 MG (65 MG ELEMENTAL FE) 325 MG: 325 (65 FE) TAB at 08:07

## 2018-06-28 RX ADMIN — FERROUS SULFATE TAB 325 MG (65 MG ELEMENTAL FE) 325 MG: 325 (65 FE) TAB at 20:17

## 2018-06-28 RX ADMIN — HYDROXYZINE HYDROCHLORIDE 25 MG: 25 TABLET ORAL at 11:08

## 2018-06-28 RX ADMIN — IBUPROFEN 600 MG: 600 TABLET ORAL at 08:13

## 2018-06-28 RX ADMIN — BUPRENORPHINE HCL 4 MG: 2 TABLET SUBLINGUAL at 08:07

## 2018-06-28 RX ADMIN — HYDROXYZINE HYDROCHLORIDE 25 MG: 25 TABLET ORAL at 16:31

## 2018-06-28 RX ADMIN — CLONIDINE HYDROCHLORIDE 0.1 MG: 0.1 TABLET ORAL at 08:13

## 2018-06-28 RX ADMIN — NICOTINE POLACRILEX 4 MG: 2 GUM, CHEWING ORAL at 16:31

## 2018-06-28 RX ADMIN — NICOTINE POLACRILEX 4 MG: 2 GUM, CHEWING ORAL at 11:08

## 2018-06-28 RX ADMIN — NICOTINE POLACRILEX 4 MG: 2 GUM, CHEWING ORAL at 22:26

## 2018-06-28 RX ADMIN — IBUPROFEN 600 MG: 600 TABLET ORAL at 16:31

## 2018-06-28 RX ADMIN — PRAZOSIN HYDROCHLORIDE 1 MG: 1 CAPSULE ORAL at 21:57

## 2018-06-28 RX ADMIN — ACETAMINOPHEN 650 MG: 325 TABLET, FILM COATED ORAL at 11:08

## 2018-06-28 ASSESSMENT — ACTIVITIES OF DAILY LIVING (ADL)
DRESS: INDEPENDENT
GROOMING: INDEPENDENT
ORAL_HYGIENE: INDEPENDENT

## 2018-06-28 NOTE — PROGRESS NOTES
"[facility], [place]   Psychiatric Progress Note    Interim history   This is a 22 year old female with opiate use dx severe  ptsd .Pt seen in rounds. Patient's mood is good   Energy Level:good  Sleep:No concerns, sleeps well through night  Appetite:normal motivation interest good   deneid any Suicidal/homicidal ideation/plan intent.  deneid any psychosis  No prior suicde attempts  No access to gun  Pt is in detox  Pt mssa/cows score are monitered  Tolerating meds and has no side effects.              Medications:     Current Facility-Administered Medications   Medication     acetaminophen (TYLENOL) tablet 650 mg     alum & mag hydroxide-simethicone (MYLANTA ES/MAALOX  ES) suspension 30 mL     benzocaine (ORAJEL MAXIMUM STRENGTH) 20 % gel     bisacodyl (DULCOLAX) Suppository 10 mg     buprenorphine (SUBUTEX) sublingual tablet 4 mg     cloNIDine (CATAPRES) tablet 0.1 mg     ferrous sulfate (IRON) tablet 325 mg     hydrOXYzine (ATARAX) tablet 25 mg     ibuprofen (ADVIL/MOTRIN) tablet 600 mg     loperamide (IMODIUM) capsule 2 mg     magnesium hydroxide (MILK OF MAGNESIA) suspension 30 mL     nicotine polacrilex (NICORETTE) gum 2-4 mg     ondansetron (ZOFRAN-ODT) ODT tab 4 mg     prazosin (MINIPRESS) capsule 1 mg     traZODone (DESYREL) tablet 50 mg             Allergies:   No Known Allergies         Psychiatric Examination:   Blood pressure 103/68, pulse 87, temperature 97.3  F (36.3  C), temperature source Oral, resp. rate 16, height 1.727 m (5' 8\"), weight 65.3 kg (144 lb), SpO2 100 %, not currently breastfeeding.  Weight is 144 lbs 0 oz  Body mass index is 21.9 kg/(m^2).    Appearance:  awake, alert and adequately groomed  Attitude:  cooperative  Eye Contact:  good  Mood:  better  Affect:  appropriate and in normal range and mood congruent  Speech:  clear, coherent rate /rhythm are good  Psychomotor Behavior:  no evidence of tardive dyskinesia, dystonia, or tics and intact station, gait " and muscle tone  Throught Process:  logical  Associations:  no loose associations  Thought Content:  no evidence of suicidal ideation or homicidal ideation, no evidence of psychotic thought, no auditory hallucinations present and no visual hallucinations present  Insight:  good  Judgement:  intact  Oriented to:  time, person, and place  Attention Span and Concentration:  intact  Recent and Remote Memory:  intact  Language fund of knowledge are adequate         Labs:     No results found for: NTBNPI, NTBNP  Lab Results   Component Value Date    WBC 5.4 06/23/2018    HGB 9.7 (L) 06/23/2018    HCT 32.7 (L) 06/23/2018    MCV 67 (L) 06/23/2018     06/23/2018     No results found for: TSH         Dx opiate use dx severe  ptsd   Plan   continue subutex 2mg sl qid   New dx of hepatits c, pt informed, will ask medicine to talk with patient     Laboratory/Imaging: reviewed with patient   Consults: internal medicine consult reviewed  Patient will be treated in therapeutic milieu with appropriate individual and group therapies as described.      Medical diagnoses to be addressed this admission:    Plan:  # Opiate abuse, acute withdrawal. IV heroin use. Management per primary team, psychiatry. Labs remarkable for Elevated ALT (323) AST (202) and Alk phos (179), denies abdominal pain. Also with mild hypernatremia, likely hypovolemic given poor PO intake.  - Agree with subutex  - Agree with hep B/C and HIV testing per primary team, please notify if in need of assistance with results  - Encourage PO intake  - Will repeat LFT's 6/25 to ensure downward trend with abstinence.      Microcytic anemia. Chronic per care everywhere. Would recommend further w/u for iron deficiency in outpatient setting.  - STAT Hgb if s/s of acute bleeding       Legal Status: voluntary    Safety Assessment:   Checks:  15 min  Precautions: withdrawal precautions  Pt has not required locked seclusion or restraints in the past 24 hours to maintain  safety, please refer to RN documentation for further details.  Discussed with patient many issues of addiction,triggers, relapse, and establishing a solid recovery program.  Able to give informed consent:  YES   Discussed Risks/Benefits/Side Effects/Alternatives: YES    After discussion of the indications, risks, benefits, alternatives and consequences of no treatment, the patient elects to complete detox and do trt.

## 2018-06-28 NOTE — PROGRESS NOTES
"Subjective:   Yamel Hernandez is a 22 year old female with new + hep C screen. Psychiatry requesting IM counseling of new diagnosis. Likely r/t IV heroin use. Patient verbalizes concern about what hep C is, how she got it,  can it be treated and how and how to prevent getting it or giving it to someone else.     Objective:  /68  Pulse 87  Temp 97.3  F (36.3  C) (Oral)  Resp 16  Ht 1.727 m (5' 8\")  Wt 65.3 kg (144 lb)  SpO2 100%  Breastfeeding? No  BMI 21.9 kg/m2    Vitals signs reviewed and stable.     15 minutes spent counseling patient.     Labs/diagnostics: None today.     Assessment and plan:    1. + hep C screen with IVDU.   - All questions answered.   - Aware regarding sexual protection, spread via blood.   - Aware to f/u with PCP to have quant test completed, referral to hepatology once sobriety achieved.   - Avoid alcohol, tylenol.   - [facility] form faxed.     Kimberley Ferguson, EMMIE, CNP  [facility] Hospitalist Service          "

## 2018-06-29 PROCEDURE — 25000132 ZZH RX MED GY IP 250 OP 250 PS 637: Performed by: NURSE PRACTITIONER

## 2018-06-29 PROCEDURE — 99231 SBSQ HOSP IP/OBS SF/LOW 25: CPT | Performed by: PSYCHIATRY & NEUROLOGY

## 2018-06-29 PROCEDURE — 25000132 ZZH RX MED GY IP 250 OP 250 PS 637: Performed by: PSYCHIATRY & NEUROLOGY

## 2018-06-29 PROCEDURE — 12800012 ZZH R&B CD MH INTERMEDIATE ADULT

## 2018-06-29 RX ADMIN — IBUPROFEN 600 MG: 600 TABLET ORAL at 16:07

## 2018-06-29 RX ADMIN — NICOTINE POLACRILEX 4 MG: 2 GUM, CHEWING ORAL at 11:58

## 2018-06-29 RX ADMIN — HYDROXYZINE HYDROCHLORIDE 25 MG: 25 TABLET ORAL at 12:43

## 2018-06-29 RX ADMIN — NICOTINE POLACRILEX 4 MG: 2 GUM, CHEWING ORAL at 16:07

## 2018-06-29 RX ADMIN — NICOTINE POLACRILEX 4 MG: 2 GUM, CHEWING ORAL at 20:28

## 2018-06-29 RX ADMIN — CLONIDINE HYDROCHLORIDE 0.1 MG: 0.1 TABLET ORAL at 11:57

## 2018-06-29 RX ADMIN — BUPRENORPHINE HCL 4 MG: 2 TABLET SUBLINGUAL at 20:29

## 2018-06-29 RX ADMIN — ACETAMINOPHEN 650 MG: 325 TABLET, FILM COATED ORAL at 08:33

## 2018-06-29 RX ADMIN — HYDROXYZINE HYDROCHLORIDE 25 MG: 25 TABLET ORAL at 08:33

## 2018-06-29 RX ADMIN — FERROUS SULFATE TAB 325 MG (65 MG ELEMENTAL FE) 325 MG: 325 (65 FE) TAB at 20:28

## 2018-06-29 RX ADMIN — CLONIDINE HYDROCHLORIDE 0.1 MG: 0.1 TABLET ORAL at 20:29

## 2018-06-29 RX ADMIN — PRAZOSIN HYDROCHLORIDE 1 MG: 1 CAPSULE ORAL at 21:40

## 2018-06-29 RX ADMIN — ACETAMINOPHEN 650 MG: 325 TABLET, FILM COATED ORAL at 21:40

## 2018-06-29 RX ADMIN — TRAZODONE HYDROCHLORIDE 50 MG: 50 TABLET ORAL at 21:40

## 2018-06-29 RX ADMIN — FERROUS SULFATE TAB 325 MG (65 MG ELEMENTAL FE) 325 MG: 325 (65 FE) TAB at 08:32

## 2018-06-29 RX ADMIN — BUPRENORPHINE HCL 4 MG: 2 TABLET SUBLINGUAL at 08:31

## 2018-06-29 ASSESSMENT — ACTIVITIES OF DAILY LIVING (ADL)
LAUNDRY: WITH SUPERVISION
ORAL_HYGIENE: INDEPENDENT
DRESS: INDEPENDENT
GROOMING: INDEPENDENT

## 2018-06-29 NOTE — PROGRESS NOTES
Received call from [facility].  They will approve funding for [employer].  Call placed to [facility] with funding approval. [facility] will review and call us to set up admission when review is complete.  Pt informed.

## 2018-06-29 NOTE — PROGRESS NOTES
"[facility], [place]   Psychiatric Progress Note    Interim history   This is a 22 year old female with opiate use dx severe  ptsd .Pt seen in rounds. Patient's mood is good   Energy Level:good  Sleep:No concerns, sleeps well through night  Appetite:normal motivation interest good   deneid any Suicidal/homicidal ideation/plan intent.  deneid any psychosis  No prior suicde attempts  No access to gun  Pt is in detox  Pt mssa/cows score are monitered  Tolerating meds and has no side effects.              Medications:     Current Facility-Administered Medications   Medication     acetaminophen (TYLENOL) tablet 650 mg     alum & mag hydroxide-simethicone (MYLANTA ES/MAALOX  ES) suspension 30 mL     benzocaine (ORAJEL MAXIMUM STRENGTH) 20 % gel     bisacodyl (DULCOLAX) Suppository 10 mg     buprenorphine (SUBUTEX) sublingual tablet 4 mg     cloNIDine (CATAPRES) tablet 0.1 mg     ferrous sulfate (IRON) tablet 325 mg     hydrOXYzine (ATARAX) tablet 25 mg     ibuprofen (ADVIL/MOTRIN) tablet 600 mg     loperamide (IMODIUM) capsule 2 mg     magnesium hydroxide (MILK OF MAGNESIA) suspension 30 mL     nicotine polacrilex (NICORETTE) gum 2-4 mg     ondansetron (ZOFRAN-ODT) ODT tab 4 mg     prazosin (MINIPRESS) capsule 1 mg     traZODone (DESYREL) tablet 50 mg             Allergies:   No Known Allergies         Psychiatric Examination:   Blood pressure 107/59, pulse 77, temperature 98.8  F (37.1  C), temperature source Tympanic, resp. rate 16, height 1.727 m (5' 8\"), weight 65.3 kg (144 lb), SpO2 100 %, not currently breastfeeding.  Weight is 144 lbs 0 oz  Body mass index is 21.9 kg/(m^2).    Appearance:  awake, alert and adequately groomed  Attitude:  cooperative  Eye Contact:  good  Mood:  better  Affect:  appropriate and in normal range and mood congruent  Speech:  clear, coherent rate /rhythm are good  Psychomotor Behavior:  no evidence of tardive dyskinesia, dystonia, or tics and intact station, " gait and muscle tone  Throught Process:  logical  Associations:  no loose associations  Thought Content:  no evidence of suicidal ideation or homicidal ideation, no evidence of psychotic thought, no auditory hallucinations present and no visual hallucinations present  Insight:  good  Judgement:  intact  Oriented to:  time, person, and place  Attention Span and Concentration:  intact  Recent and Remote Memory:  intact  Language fund of knowledge are adequate         Labs:     No results found for: NTBNPI, NTBNP  Lab Results   Component Value Date    WBC 5.4 06/23/2018    HGB 9.7 (L) 06/23/2018    HCT 32.7 (L) 06/23/2018    MCV 67 (L) 06/23/2018     06/23/2018     No results found for: TSH         Dx opiate use dx severe  ptsd   Plan   continue subutex 2mg sl qid   New dx of hepatits c, pt informed, will  medicine to talked with patient     Laboratory/Imaging: reviewed with patient   Consults: internal medicine consult reviewed  Patient will be treated in therapeutic milieu with appropriate individual and group therapies as described.      Medical diagnoses to be addressed this admission:    Plan:  # Opiate abuse, acute withdrawal. IV heroin use. Management per primary team, psychiatry. Labs remarkable for Elevated ALT (323) AST (202) and Alk phos (179), denies abdominal pain. Also with mild hypernatremia, likely hypovolemic given poor PO intake.  - Agree with subutex  - Agree with hep B/C and HIV testing per primary team, please notify if in need of assistance with results  - Encourage PO intake  - Will repeat LFT's 6/25 to ensure downward trend with abstinence.      Microcytic anemia. Chronic per care everywhere. Would recommend further w/u for iron deficiency in outpatient setting.  - STAT Hgb if s/s of acute bleeding       Legal Status: voluntary    Safety Assessment:   Checks:  15 min  Precautions: withdrawal precautions  Pt has not required locked seclusion or restraints in the past 24 hours to maintain  safety, please refer to RN documentation for further details.  Discussed with patient many issues of addiction,triggers, relapse, and establishing a solid recovery program.  Able to give informed consent:  YES   Discussed Risks/Benefits/Side Effects/Alternatives: YES    After discussion of the indications, risks, benefits, alternatives and consequences of no treatment, the patient elects to complete detox and do trt.

## 2018-06-29 NOTE — PROGRESS NOTES
[place] between counties [place] funding continues.  Resubmitted clinical to [facility] for review.

## 2018-06-29 NOTE — PROGRESS NOTES
"Behavioral Health  Note    Behavioral Health  Spirituality Group Note    UNIT [address]    Name: Yamel Hernandez YOB: 1995   MRN: [id number] Age: 22 year old      Patient attended -led group, which included discussion of spirituality, coping with illness and building resilience.    Patient attended group for 1.0 hrs.    patient minimally participated, but was respectful to the group process. Yamel (pron: \"Shalini-DREHY\") shared briefly once or twice, but mostly listened and nodded as she listened to others. She participated in all activities.    Topic/Focus of today's spirituality group: Reverence: Junior and Shanna NG tie-in: Taking Action  Practice Taught: 4-7-8 Breathing      Kimberley Martinez MDiv, [facility]  Lead , Adult Behavioral Health  Pager [phone]          "

## 2018-06-30 PROCEDURE — 25000132 ZZH RX MED GY IP 250 OP 250 PS 637: Performed by: PSYCHIATRY & NEUROLOGY

## 2018-06-30 PROCEDURE — 25000132 ZZH RX MED GY IP 250 OP 250 PS 637: Performed by: NURSE PRACTITIONER

## 2018-06-30 PROCEDURE — 25000125 ZZHC RX 250: Performed by: CLINICAL NURSE SPECIALIST

## 2018-06-30 PROCEDURE — 12800012 ZZH R&B CD MH INTERMEDIATE ADULT

## 2018-06-30 RX ADMIN — CLONIDINE HYDROCHLORIDE 0.1 MG: 0.1 TABLET ORAL at 20:32

## 2018-06-30 RX ADMIN — HYDROXYZINE HYDROCHLORIDE 25 MG: 25 TABLET ORAL at 16:26

## 2018-06-30 RX ADMIN — TRAZODONE HYDROCHLORIDE 50 MG: 50 TABLET ORAL at 22:26

## 2018-06-30 RX ADMIN — BUPRENORPHINE HCL 4 MG: 2 TABLET SUBLINGUAL at 08:21

## 2018-06-30 RX ADMIN — CLONIDINE HYDROCHLORIDE 0.1 MG: 0.1 TABLET ORAL at 08:21

## 2018-06-30 RX ADMIN — Medication: at 12:41

## 2018-06-30 RX ADMIN — FERROUS SULFATE TAB 325 MG (65 MG ELEMENTAL FE) 325 MG: 325 (65 FE) TAB at 20:30

## 2018-06-30 RX ADMIN — HYDROXYZINE HYDROCHLORIDE 25 MG: 25 TABLET ORAL at 12:41

## 2018-06-30 RX ADMIN — BUPRENORPHINE HCL 4 MG: 2 TABLET SUBLINGUAL at 20:36

## 2018-06-30 RX ADMIN — PRAZOSIN HYDROCHLORIDE 1 MG: 1 CAPSULE ORAL at 22:26

## 2018-06-30 RX ADMIN — IBUPROFEN 600 MG: 600 TABLET ORAL at 12:41

## 2018-06-30 RX ADMIN — NICOTINE POLACRILEX 4 MG: 2 GUM, CHEWING ORAL at 12:41

## 2018-06-30 RX ADMIN — FERROUS SULFATE TAB 325 MG (65 MG ELEMENTAL FE) 325 MG: 325 (65 FE) TAB at 08:21

## 2018-06-30 RX ADMIN — NICOTINE POLACRILEX 4 MG: 2 GUM, CHEWING ORAL at 20:32

## 2018-06-30 RX ADMIN — NICOTINE POLACRILEX 4 MG: 2 GUM, CHEWING ORAL at 08:21

## 2018-06-30 ASSESSMENT — ACTIVITIES OF DAILY LIVING (ADL)
ORAL_HYGIENE: INDEPENDENT
GROOMING: INDEPENDENT
DRESS: INDEPENDENT
LAUNDRY: WITH SUPERVISION

## 2018-06-30 NOTE — PLAN OF CARE
Problem: Substance Withdrawal  Goal: Substance Withdrawal  Signs and symptoms of listed problems will be absent or manageable.   Outcome: Improving  Pt denies SI/SIB/HI.  Pt active in the milieu.  COWS scores of 3 and 4.  PRN clonidine x 1 for withdrawals, atarax x 1 for anxiety. Pt denies any other concerns.  Will continue to monitor.

## 2018-07-01 PROCEDURE — 12800012 ZZH R&B CD MH INTERMEDIATE ADULT

## 2018-07-01 PROCEDURE — 25000132 ZZH RX MED GY IP 250 OP 250 PS 637: Performed by: PSYCHIATRY & NEUROLOGY

## 2018-07-01 PROCEDURE — 25000132 ZZH RX MED GY IP 250 OP 250 PS 637: Performed by: INTERNAL MEDICINE

## 2018-07-01 PROCEDURE — 25000132 ZZH RX MED GY IP 250 OP 250 PS 637: Performed by: NURSE PRACTITIONER

## 2018-07-01 RX ORDER — NAPROXEN 500 MG/1
500 TABLET ORAL 2 TIMES DAILY PRN
Status: DISCONTINUED | OUTPATIENT
Start: 2018-07-01 | End: 2018-07-02 | Stop reason: HOSPADM

## 2018-07-01 RX ORDER — NAPROXEN 250 MG/1
375 TABLET ORAL 2 TIMES DAILY PRN
Status: DISCONTINUED | OUTPATIENT
Start: 2018-07-01 | End: 2018-07-01

## 2018-07-01 RX ORDER — NAPROXEN 250 MG/1
375 TABLET ORAL 2 TIMES DAILY WITH MEALS
Status: DISCONTINUED | OUTPATIENT
Start: 2018-07-01 | End: 2018-07-01

## 2018-07-01 RX ADMIN — CLONIDINE HYDROCHLORIDE 0.1 MG: 0.1 TABLET ORAL at 20:33

## 2018-07-01 RX ADMIN — CLONIDINE HYDROCHLORIDE 0.1 MG: 0.1 TABLET ORAL at 12:13

## 2018-07-01 RX ADMIN — HYDROXYZINE HYDROCHLORIDE 25 MG: 25 TABLET ORAL at 16:07

## 2018-07-01 RX ADMIN — HYDROXYZINE HYDROCHLORIDE 25 MG: 25 TABLET ORAL at 23:46

## 2018-07-01 RX ADMIN — FERROUS SULFATE TAB 325 MG (65 MG ELEMENTAL FE) 325 MG: 325 (65 FE) TAB at 20:33

## 2018-07-01 RX ADMIN — NICOTINE POLACRILEX 4 MG: 2 GUM, CHEWING ORAL at 16:07

## 2018-07-01 RX ADMIN — TRAZODONE HYDROCHLORIDE 50 MG: 50 TABLET ORAL at 23:46

## 2018-07-01 RX ADMIN — NICOTINE POLACRILEX 4 MG: 2 GUM, CHEWING ORAL at 08:38

## 2018-07-01 RX ADMIN — HYDROXYZINE HYDROCHLORIDE 25 MG: 25 TABLET ORAL at 08:38

## 2018-07-01 RX ADMIN — BUPRENORPHINE HCL 4 MG: 2 TABLET SUBLINGUAL at 08:38

## 2018-07-01 RX ADMIN — FERROUS SULFATE TAB 325 MG (65 MG ELEMENTAL FE) 325 MG: 325 (65 FE) TAB at 08:38

## 2018-07-01 RX ADMIN — NICOTINE POLACRILEX 4 MG: 2 GUM, CHEWING ORAL at 20:33

## 2018-07-01 RX ADMIN — PRAZOSIN HYDROCHLORIDE 1 MG: 1 CAPSULE ORAL at 21:29

## 2018-07-01 RX ADMIN — BUPRENORPHINE HCL 4 MG: 2 TABLET SUBLINGUAL at 20:33

## 2018-07-01 RX ADMIN — NAPROXEN 500 MG: 500 TABLET ORAL at 23:46

## 2018-07-01 RX ADMIN — TRAZODONE HYDROCHLORIDE 50 MG: 50 TABLET ORAL at 21:29

## 2018-07-01 RX ADMIN — NICOTINE POLACRILEX 4 MG: 2 GUM, CHEWING ORAL at 12:13

## 2018-07-01 ASSESSMENT — ACTIVITIES OF DAILY LIVING (ADL)
DRESS: INDEPENDENT
GROOMING: INDEPENDENT
ORAL_HYGIENE: INDEPENDENT

## 2018-07-02 VITALS
OXYGEN SATURATION: 100 % | WEIGHT: 144 LBS | HEART RATE: 105 BPM | DIASTOLIC BLOOD PRESSURE: 69 MMHG | HEIGHT: 68 IN | SYSTOLIC BLOOD PRESSURE: 108 MMHG | RESPIRATION RATE: 16 BRPM | TEMPERATURE: 97.4 F | BODY MASS INDEX: 21.82 KG/M2

## 2018-07-02 PROCEDURE — 25000132 ZZH RX MED GY IP 250 OP 250 PS 637: Performed by: PSYCHIATRY & NEUROLOGY

## 2018-07-02 PROCEDURE — 99239 HOSP IP/OBS DSCHRG MGMT >30: CPT | Performed by: PSYCHIATRY & NEUROLOGY

## 2018-07-02 PROCEDURE — 25000132 ZZH RX MED GY IP 250 OP 250 PS 637: Performed by: NURSE PRACTITIONER

## 2018-07-02 RX ADMIN — BUPRENORPHINE HCL 4 MG: 2 TABLET SUBLINGUAL at 08:33

## 2018-07-02 RX ADMIN — FERROUS SULFATE TAB 325 MG (65 MG ELEMENTAL FE) 325 MG: 325 (65 FE) TAB at 08:33

## 2018-07-02 RX ADMIN — HYDROXYZINE HYDROCHLORIDE 25 MG: 25 TABLET ORAL at 08:33

## 2018-07-02 RX ADMIN — NICOTINE POLACRILEX 4 MG: 2 GUM, CHEWING ORAL at 08:33

## 2018-07-02 NOTE — PROGRESS NOTES
Pt verbalized complete understanding of all discharge instructions. Pt discharged to home transported by self.

## 2018-07-02 NOTE — PROGRESS NOTES
"[facility], [place]   Psychiatric Progress Note    Interim history   This is a 22 year old female with opiate use dx severe  ptsd .Pt seen in rounds. Patient's mood is good   Energy Level:good  Sleep:No concerns, sleeps well through night  Appetite:normal motivation interest good   deneid any Suicidal/homicidal ideation/plan intent.  deneid any psychosis  No prior suicde attempts  No access to gun  Pt is in detox  Pt mssa/cows score are monitered  Tolerating meds and has no side effects.              Medications:     Current Facility-Administered Medications   Medication     acetaminophen (TYLENOL) tablet 650 mg     alum & mag hydroxide-simethicone (MYLANTA ES/MAALOX  ES) suspension 30 mL     benzocaine (ORAJEL MAXIMUM STRENGTH) 20 % gel     bisacodyl (DULCOLAX) Suppository 10 mg     buprenorphine (SUBUTEX) sublingual tablet 4 mg     cloNIDine (CATAPRES) tablet 0.1 mg     ferrous sulfate (IRON) tablet 325 mg     hydrOXYzine (ATARAX) tablet 25 mg     loperamide (IMODIUM) capsule 2 mg     magnesium hydroxide (MILK OF MAGNESIA) suspension 30 mL     naproxen (NAPROSYN) tablet 500 mg     nicotine polacrilex (NICORETTE) gum 2-4 mg     ondansetron (ZOFRAN-ODT) ODT tab 4 mg     prazosin (MINIPRESS) capsule 1 mg     traZODone (DESYREL) tablet 50 mg             Allergies:   No Known Allergies         Psychiatric Examination:   Blood pressure 108/69, pulse 105, temperature 97.4  F (36.3  C), temperature source Oral, resp. rate 16, height 1.727 m (5' 8\"), weight 65.3 kg (144 lb), SpO2 100 %, not currently breastfeeding.  Weight is 144 lbs 0 oz  Body mass index is 21.9 kg/(m^2).    Appearance:  awake, alert and adequately groomed  Attitude:  cooperative  Eye Contact:  good  Mood:  better  Affect:  appropriate and in normal range and mood congruent  Speech:  clear, coherent rate /rhythm are good  Psychomotor Behavior:  no evidence of tardive dyskinesia, dystonia, or tics and intact station, gait and " muscle tone  Throught Process:  logical  Associations:  no loose associations  Thought Content:  no evidence of suicidal ideation or homicidal ideation, no evidence of psychotic thought, no auditory hallucinations present and no visual hallucinations present  Insight:  good  Judgement:  intact  Oriented to:  time, person, and place  Attention Span and Concentration:  intact  Recent and Remote Memory:  intact  Language fund of knowledge are adequate         Labs:     No results found for: NTBNPI, NTBNP  Lab Results   Component Value Date    WBC 5.4 06/23/2018    HGB 9.7 (L) 06/23/2018    HCT 32.7 (L) 06/23/2018    MCV 67 (L) 06/23/2018     06/23/2018     No results found for: TSH         Dx opiate use dx severe  ptsd   Plan   continue subutex 2mg sl qid   New dx of hepatits c, pt informed, will  medicine to talked with patient     Laboratory/Imaging: reviewed with patient   Consults: internal medicine consult reviewed  Patient will be treated in therapeutic milieu with appropriate individual and group therapies as described.      Medical diagnoses to be addressed this admission:    Plan:  # Opiate abuse, acute withdrawal. IV heroin use. Management per primary team, psychiatry. Labs remarkable for Elevated ALT (323) AST (202) and Alk phos (179), denies abdominal pain. Also with mild hypernatremia, likely hypovolemic given poor PO intake.  - Agree with subutex  - Agree with hep B/C and HIV testing per primary team, please notify if in need of assistance with results  - Encourage PO intake  - Will repeat LFT's 6/25 to ensure downward trend with abstinence.      Microcytic anemia. Chronic per care everywhere. Would recommend further w/u for iron deficiency in outpatient setting.  - STAT Hgb if s/s of acute bleeding       Legal Status: voluntary    Safety Assessment:   Checks:  15 min  Precautions: withdrawal precautions  Pt has not required locked seclusion or restraints in the past 24 hours to maintain safety,  please refer to RN documentation for further details.  Discussed with patient many issues of addiction,triggers, relapse, and establishing a solid recovery program.  Able to give informed consent:  YES   Discussed Risks/Benefits/Side Effects/Alternatives: YES    After discussion of the indications, risks, benefits, alternatives and consequences of no treatment, the patient elects to complete detox and do trt.

## 2018-07-02 NOTE — DISCHARGE INSTRUCTIONS
Behavioral Discharge Planning and Instructions  THANK YOU FOR CHOOSING THE [facility], HEALTH  [facility]  [phone]    Summary: You were admitted to [facility] on 6/22/18 for detoxification from opiates.  A medical exam was performed that included lab work. You have met with a  and opted to enter treatment at [facility].  You are scheduled for admission on Friday, July 6th at 9:30 AM.  The address is [address] [place] [address].  Please arrive about 15 minutes early.  Bring your clothing, a 30 day supply of medications and your hygiene products with you.  Your funding is through [facility].  You have a Suboxone maintenance appointment scheduled as noted below.  Please take care and make your recovery a priority, Miss Hernandez!    Main Diagnosis: Per Dr. Yeimy Valiente MD;  305.20 (F12.10) Cannabis Use Disorder Mild  304.00 (F11.20) Opioid Use Disorder Severe  304.40 (F15.20) Amphetamine Use Disorder Severe    Major Treatments, Procedures and Findings:  You have withdrawn from opiates  using buprenorphine.  You have met with a  to develop a treatment plan for discharge.  You have had labs drawn and those results have been reviewed with you. Please take a copy of your lab work with you to your next primary care physician appointment.    Symptoms to Report:  If you experience more anxiety, confusion, sleeplessness, deep sadness or thoughts of suicide, notify your treatment team or notify your primary care physician. IF ANY OF THE SYMPTOMS YOU ARE EXPERIENCING ARE A MEDICAL EMERGENCY CALL 911 IMMEDIATELY.     Lifestyle Adjustment: Adjust your lifestyle to get enough sleep, relaxation, exercise and  good nutrition. Continue to develop healthy coping skills to decrease stress and promote a sober living environment. Do not use alcohol, illegal drugs or addictive medications other than what is currently prescribed. AA, NA, and  Sponsor are excellent resources for  support.     Suboxone Maintenance:  [facility] [address] [facility]   Thursday, July 5th @ 1:45 PM with Dr. Sampson  #[phone]    Disposition:   [facility] Friday, July 6th @ 9:30 AM  [address] [facility]  [phone]    Resource: National [place] on Mental Illness (www.mn.cherelle.org): 989.183.3650 or 793-307-2830.  Alcoholics Anonymous (www.alcoholics-anonymous.org): Check your phone book for your local chapter.  Suicide Awareness Voices of Education (SAVE) (www.save.org): 472-961-DWMR (5862)  National Suicide Prevention Line (www.mentalhealthmn.org): 955-419-FXAF (9665)  Mental Health Consumer/Survivor Network of [place] (www.mhcsn.net): 182.486.3341 or 507-769-7775  Mental Health Association of [place] (www.mentalhealth.org): 421.941.2845 or 435-414-0765    General Medication Instructions:  See your medication sheet(s) for instructions.   Take all medicines as directed.  Make no changes unless your doctor suggests them.   Do not use any drugs not prescribed by your provider.  Avoid alcohol.    Any follow up concerns:  Nursing questions call the [address] [phone]  Medical Record call [phone]   Outpatient Behavioral Intake call [phone]  LP+ Wait List/Bed Availability call [phone]  The entire treatment team has appreciated the opportunity to work with clark Yamel.  We wish you the best in the future and with your lifelong recovery goals. Please bring this discharge folder with you to all follow up appointments.  It contains your lab results, diagnosis, medication list and discharge recommendations.  THANK YOU FOR CHOOSING THE [facility]

## 2018-07-02 NOTE — DISCHARGE SUMMARY
More than 35 minutes was spent on this discharge summary, doing the discharge instructions, discharge medications, discharge mental status examination.      DISCHARGE DIAGNOSES:   Axis I:     1.  Opiate use disorder, severe.    2.  Posttraumatic stress disorder.       IDENTIFYING INFORMATION:  The patient is a 22-year-old  female admitted for detox.  Please see admission note by Dr. Valiente on 06/23/2018.      HOSPITAL COURSE:  During the hospitalization, the patient had an up-titration of Suboxone.  She was seen by Lizet Crawford on 06/23 per Internal Medicine consult.  The patient had lab work done, which was normal comprehensive metabolic panel.  ALT is 212, AST is 106.  Iron is 18 and hemoglobin is 9.7, MCHC is 29.7.  During the hospitalization, the patient's energy, motivation, sleep and interest improved.  She did not have any suicidal or homicidal ideation, plan or intent.  She was set up to do treatment at [facility].  [facility]'s funding came through, but there is no bed.  She says that she has a sober place to go and she is willing to wait there.  She has a Suboxone appointment on 07/05.      DISCHARGE DISPOSITION:  The patient going home.      DISCHARGE FOLLOWUP:  On 07/06 at [facility] and followup with Dr. Sampson.                     DISCHARGE MENTAL STATUS EXAMINATION:  The patient is alert, oriented x3.  Good fund of knowledge.  Good use of language.  Recent and remote memory, language, fund of knowledge are all adequate.  Euthymic mood congruent affect  Speech normal rate/rhythm linear tp no loose asso,The patient does not have any active suicidal or homicidal ideation.  Does not have any auditory or visual hallucination.  Fair insight/judgment At this time, the patient was stable to be discharged.         Educated about side effects/risk vs benefits /alternative including non treatment.Pt consented to be on medication.     .Total time spent on discharge summary more than 35 min   More than  20 min  planning, coordination of care, medication reconciliation and performance of physical exam on day of discharge.Care was coordinated with unit RN and unit therapist       Charles Norris   Home Medication Instructions LENNY:[id number]    Printed on:18   Medication Information                      buprenorphine HCl-naloxone HCl (SUBOXONE) 8-2 MG per film  Place 1 Film under the tongue daily             etonogestrel (IMPLANON/NEXPLANON) 68 MG IMPL  1 each by Subdermal route once             ferrous sulfate (IRON) 325 (65 Fe) MG tablet  Take 1 tablet (325 mg) by mouth 2 times daily             hydrOXYzine (ATARAX) 25 MG tablet  Take 1 tablet (25 mg) by mouth every 8 hours as needed for anxiety             nicotine polacrilex (NICORETTE) 2 MG gum  Place 1-2 each (2-4 mg) inside cheek every 2 hours as needed for other (nicotine withdrawal symptoms)             prazosin (MINIPRESS) 1 MG capsule  Take 1 capsule (1 mg) by mouth At Bedtime             traZODone (DESYREL) 50 MG tablet  Take 1 tablet (50 mg) by mouth nightly as needed for sleep               MELANY NEWTON MD             D: 2018   T: 2018   MT: LORIN      Name:     CHARLES NORRIS   MRN:      [id number]        Account:        [id number]   :      1995           Admit Date:     2018                                  Discharge Date:       Document: [id number]